# Patient Record
Sex: FEMALE | Race: WHITE | Employment: FULL TIME | URBAN - METROPOLITAN AREA
[De-identification: names, ages, dates, MRNs, and addresses within clinical notes are randomized per-mention and may not be internally consistent; named-entity substitution may affect disease eponyms.]

---

## 2019-09-28 ENCOUNTER — APPOINTMENT (OUTPATIENT)
Dept: GENERAL RADIOLOGY | Facility: CLINIC | Age: 34
End: 2019-09-28
Attending: EMERGENCY MEDICINE
Payer: COMMERCIAL

## 2019-09-28 ENCOUNTER — HOSPITAL ENCOUNTER (EMERGENCY)
Facility: CLINIC | Age: 34
Discharge: HOME | End: 2019-09-28
Attending: EMERGENCY MEDICINE | Admitting: EMERGENCY MEDICINE
Payer: COMMERCIAL

## 2019-09-28 VITALS
HEART RATE: 96 BPM | SYSTOLIC BLOOD PRESSURE: 115 MMHG | DIASTOLIC BLOOD PRESSURE: 84 MMHG | TEMPERATURE: 98.3 F | RESPIRATION RATE: 16 BRPM | OXYGEN SATURATION: 96 %

## 2019-09-28 DIAGNOSIS — R55 NEAR SYNCOPE: ICD-10-CM

## 2019-09-28 DIAGNOSIS — R07.9 CHEST PAIN, UNSPECIFIED TYPE: ICD-10-CM

## 2019-09-28 LAB
ANION GAP SERPL CALCULATED.3IONS-SCNC: 6 MMOL/L (ref 3–14)
BUN SERPL-MCNC: 23 MG/DL (ref 7–30)
CALCIUM SERPL-MCNC: 9.2 MG/DL (ref 8.5–10.1)
CHLORIDE SERPL-SCNC: 106 MMOL/L (ref 94–109)
CO2 SERPL-SCNC: 27 MMOL/L (ref 20–32)
CREAT SERPL-MCNC: 0.94 MG/DL (ref 0.52–1.04)
D DIMER PPP FEU-MCNC: 0.4 UG/ML FEU (ref 0–0.5)
ERYTHROCYTE [DISTWIDTH] IN BLOOD BY AUTOMATED COUNT: 11.9 % (ref 10–15)
GFR SERPL CREATININE-BSD FRML MDRD: 80 ML/MIN/{1.73_M2}
GLUCOSE SERPL-MCNC: 108 MG/DL (ref 70–99)
HCG SERPL QL: NEGATIVE
HCT VFR BLD AUTO: 44.3 % (ref 35–47)
HGB BLD-MCNC: 15.1 G/DL (ref 11.7–15.7)
MCH RBC QN AUTO: 31.5 PG (ref 26.5–33)
MCHC RBC AUTO-ENTMCNC: 34.1 G/DL (ref 31.5–36.5)
MCV RBC AUTO: 93 FL (ref 78–100)
PLATELET # BLD AUTO: 261 10E9/L (ref 150–450)
POTASSIUM SERPL-SCNC: 3.5 MMOL/L (ref 3.4–5.3)
RBC # BLD AUTO: 4.79 10E12/L (ref 3.8–5.2)
SODIUM SERPL-SCNC: 139 MMOL/L (ref 133–144)
TROPONIN I SERPL-MCNC: <0.015 UG/L (ref 0–0.04)
TSH SERPL DL<=0.005 MIU/L-ACNC: 1.77 MU/L (ref 0.4–4)
WBC # BLD AUTO: 9.6 10E9/L (ref 4–11)

## 2019-09-28 PROCEDURE — 96360 HYDRATION IV INFUSION INIT: CPT

## 2019-09-28 PROCEDURE — 85379 FIBRIN DEGRADATION QUANT: CPT | Performed by: EMERGENCY MEDICINE

## 2019-09-28 PROCEDURE — 99285 EMERGENCY DEPT VISIT HI MDM: CPT | Mod: 25

## 2019-09-28 PROCEDURE — 84443 ASSAY THYROID STIM HORMONE: CPT | Performed by: EMERGENCY MEDICINE

## 2019-09-28 PROCEDURE — 85027 COMPLETE CBC AUTOMATED: CPT | Performed by: EMERGENCY MEDICINE

## 2019-09-28 PROCEDURE — 96361 HYDRATE IV INFUSION ADD-ON: CPT

## 2019-09-28 PROCEDURE — 80048 BASIC METABOLIC PNL TOTAL CA: CPT | Performed by: EMERGENCY MEDICINE

## 2019-09-28 PROCEDURE — 84484 ASSAY OF TROPONIN QUANT: CPT | Performed by: EMERGENCY MEDICINE

## 2019-09-28 PROCEDURE — 93005 ELECTROCARDIOGRAM TRACING: CPT

## 2019-09-28 PROCEDURE — 25000128 H RX IP 250 OP 636: Performed by: EMERGENCY MEDICINE

## 2019-09-28 PROCEDURE — 84703 CHORIONIC GONADOTROPIN ASSAY: CPT | Performed by: EMERGENCY MEDICINE

## 2019-09-28 PROCEDURE — 71046 X-RAY EXAM CHEST 2 VIEWS: CPT

## 2019-09-28 RX ADMIN — SODIUM CHLORIDE 1000 ML: 9 INJECTION, SOLUTION INTRAVENOUS at 15:20

## 2019-09-28 ASSESSMENT — ENCOUNTER SYMPTOMS
DIZZINESS: 1
NAUSEA: 0
SHORTNESS OF BREATH: 1
NUMBNESS: 1

## 2019-09-28 NOTE — ED TRIAGE NOTES
Patient presents to the ED reporting lightheadedness and chest heaviness. States that yesterday had some numbness in her right hand and today has numbness in the left hand.

## 2019-09-28 NOTE — ED PROVIDER NOTES
History     Chief Complaint:  Dizziness      HPI   Kiki Barker is a 34 year old female who presents with dizziness which suddenly began at 1330 while she was at the store. She describes this sensation like she was going to fall down. She then she developed shortness of breath and went outside with her children but her symptoms didn't resolve. She then experienced numbness in the left arm. She used the Apple Watch EKG feature which was inconclusive. She also endorses chest heaviness. The heaviness is to the entire chest, constant and without aggravating factors. She did have numbness in right arm yesterday which she attributed to the flu shot. Her mother had A-Fib and had a stroke in her 30s. Patient denies nausea.     Cardiac Risk Factors   Sex: Female   Tobacco: Negative   Hypertension: Negative  Diabetes: Negative  Hyperlipidemia: Negative  Family History: Negative    PE/DVT Risk Factors   Personal History: Negative  Recent Travel: Travelled to [place] two weeks ago.  Recent Surgery/Hospitalization: Negative  Tobacco: Negative  Family History: Negative  Hormone Use: Positive- on oral birth control  Cancer: Negative  Trauma: Negative    Heterozygous for Factor V Leiden     Allergies:  Neomycin     Medications:    Cetirizine  Oral birth control     Past Medical History:    Depression  Heterozygous Factor V Leiden    Past Surgical History:    History reviewed. No pertinent past surgical history.    Family History:    AFib  Stroke    Social History:  Presents to the ED by herself.   PCP: Olivia Lieberman  Marital Status:  Single [1]     Review of Systems   Respiratory: Positive for shortness of breath.    Cardiovascular: Positive for chest pain.   Gastrointestinal: Negative for nausea.   Neurological: Positive for dizziness and numbness.   All other systems reviewed and are negative.    Physical Exam   First Vitals:  Patient Vitals for the past 24 hrs:   BP Temp Temp src Pulse Heart Rate Resp SpO2   09/28/19  1545 (!) 136/97 -- -- 94 98 -- 97 %   09/28/19 1530 135/89 -- -- 106 91 -- 98 %   09/28/19 1515 (!) 191/102 -- -- 94 -- -- 100 %   09/28/19 1510 (!) 191/102 98.3  F (36.8  C) Oral 128 -- 16 100 %       Physical Exam    General:   Pleasant, age appropriate female smiling and laughing during exam.  HEENT:    Oropharynx is moist  Eyes:    Conjunctiva normal  Neck:    Supple, no meningismus.     CV:     Tachycardic, regular rhythm.      No murmurs, rubs or gallops.       No unilateral leg swelling.       2+ radial pulses bilateral.       No lower extremity edema.  PULM:    Clear to auscultation bilateral.       No respiratory distress.      Good air exchange.     No rales or wheezing.  ABD:    Soft, non-tender, non-distended.       No pulsatile masses.       No rebound, guarding or rigidity.  MSK:     No gross deformity to all four extremities.   LYMPH:   No cervical lymphadenopathy.  NEURO:   Alert. Good muscle tone, no atrophy.     Speech is clear and without aphasia  Skin:    Warm, dry and intact.    Psych:    Mood is good and affect is appropriate.      HEART SCORE    History:   Highly suspicious          Moderately suspicious         Slightly suspicious     0    ECG:   Significant ST depression        Non-specific repolarization abnormality       Normal       0    Age:   > 65            45-65            < 45       0    Risk Factors:  3 or more           1-2            No risk factors      0    Troponin:   > 3x normal limit     1-3x normal limit     < normal limit      0    Total:           0      Emergency Department Course   ECG:  @ 1516  Indication: Dizziness  Vent. Rate 112 bpm. DE interval 122 ms. QRS duration 92 ms. QT/QTc 328/447 ms. P-R-T axis 51 13 90.   Sinus tachycardia. Possible lateral infarct, age undetermined. Cannot rule out inferior infarct, age undetermined. Abnormal ECG.    Read @ 1516 by Dr. Santamaria.    Imaging:  Radiographic findings were communicated with the patient who voiced understanding  of the findings.    Chest XR,  PA & LAT   Final Result   IMPRESSION: Negative chest.          Laboratory:  CBC:  WBC 9.6, HGB 15.1, , otherwise WNL  BMP: Glucose 108 (H), otherwise WNL (Creatinine 0.94)  Troponin: <0.015  D-Dimer: 0.4  TSH with free T4 reflex: 1.77  HCG: negative    Interventions:  1520: Normal Saline, 1 liter, IV bolus     Emergency Department Course:  The patient arrived in triage where vitals were measured and recorded.   The patient was then escorted back to the emergency department.   The patient's medical records were reviewed.  Nursing notes and vitals were reviewed.  1509: I performed an exam of the patient as documented above.  The above workup was undertaken.  1626: I rechecked the patient and discussed results.    Findings and plan explained to the Patient. Patient discharged home, status improved, with instructions regarding supportive care, medications, and reasons to return as well as the importance of close follow-up was reviewed.     Impression & Plan      Medical Decision Makin-year-old female presented to the ED with a sudden onset of dizziness described as near syncope associated with chest discomfort.  She was evaluated for atypical ACS.  EKG reveals no ischemic changes nor dysrhythmia.  Troponin is within normal limits.  Patient has a HEART score of 0. No indication for serial enzymes or provocative testing based on symptoms.  She does have risk factors for pulmonary embolus.  D-dimer negative thus no indication for CT scan of the chest.  No features to suggest aortic dissection ro aneurysm. She has no evidence of electrolyte disturbance, endocrinopathy, intravascular volume depletion to account for symptoms.  It is possible patient did have transient dysrhythmia but is no longer present.  I do not feel that Holter monitor placement is necessary at this time but if patient were to have recurrent symptoms, should be consideration.  Differential diagnosis would also  include vasovagal pathology.  Patient safe for discharge home with close follow-up primary care physician and will return to ED for any worsening symptoms.      Diagnosis:    ICD-10-CM    1. Near syncope R55    2. Chest pain, unspecified type R07.9        Disposition:  Discharged to home.       Ramona BRYSON, am serving as a scribe on 9/28/2019 at 3:09 PM to personally document services performed by Dr. Santamaria based on my observations and the provider's statements to me.   [facility] EMERGENCY DEPARTMENT       Everton Santamaria MD  09/29/19 1008

## 2019-09-28 NOTE — ED AVS SNAPSHOT
[facility] Emergency Department  [address]  [facility] [address]  Phone:  [phone]  Fax:  [phone]                                    Kiki Barker   MRN: [id number]    Department:  [facility] Emergency Department   Date of Visit:  9/28/2019           After Visit Summary Signature Page    I have received my discharge instructions, and my questions have been answered. I have discussed any challenges I see with this plan with the nurse or doctor.    ..........................................................................................................................................  Patient/Patient Representative Signature      ..........................................................................................................................................  Patient Representative Print Name and Relationship to Patient    ..................................................               ................................................  Date                                   Time    ..........................................................................................................................................  Reviewed by Signature/Title    ...................................................              ..............................................  Date                                               Time          22EPIC Rev 08/18

## 2019-09-30 LAB — INTERPRETATION ECG - MUSE: NORMAL

## 2020-09-24 ENCOUNTER — HOSPITAL ENCOUNTER (EMERGENCY)
Facility: CLINIC | Age: 35
Discharge: HOME | End: 2020-09-25
Attending: EMERGENCY MEDICINE
Payer: COMMERCIAL

## 2020-09-24 DIAGNOSIS — Z33.1 PREGNANT STATE, INCIDENTAL: ICD-10-CM

## 2020-09-24 DIAGNOSIS — R50.9 FEBRILE ILLNESS: ICD-10-CM

## 2020-09-24 PROBLEM — J45.20 MILD INTERMITTENT ASTHMA WITHOUT COMPLICATION: Status: ACTIVE | Noted: 2019-06-21

## 2020-09-24 LAB
ALBUMIN SERPL-MCNC: 4.2 G/DL (ref 3.4–5)
ALBUMIN UR-MCNC: NEGATIVE MG/DL
ALBUMIN UR-MCNC: NEGATIVE MG/DL
ALP SERPL-CCNC: 61 U/L (ref 40–150)
ALT SERPL W P-5'-P-CCNC: 14 U/L (ref 0–50)
ANION GAP SERPL CALCULATED.3IONS-SCNC: 7 MMOL/L (ref 3–14)
APPEARANCE UR: CLEAR
APPEARANCE UR: CLEAR
AST SERPL W P-5'-P-CCNC: 17 U/L (ref 0–45)
B-HCG SERPL-ACNC: 1631 IU/L (ref 0–5)
BACTERIA #/AREA URNS HPF: ABNORMAL /HPF
BASOPHILS # BLD AUTO: 0 10E9/L (ref 0–0.2)
BASOPHILS NFR BLD AUTO: 0.3 %
BILIRUB SERPL-MCNC: 1.9 MG/DL (ref 0.2–1.3)
BILIRUB UR QL STRIP: NEGATIVE
BILIRUB UR QL STRIP: NEGATIVE
BUN SERPL-MCNC: 16 MG/DL (ref 7–30)
CALCIUM SERPL-MCNC: 9.3 MG/DL (ref 8.5–10.1)
CHLORIDE SERPL-SCNC: 105 MMOL/L (ref 94–109)
CO2 SERPL-SCNC: 23 MMOL/L (ref 20–32)
COLOR UR AUTO: ABNORMAL
COLOR UR AUTO: NORMAL
CREAT SERPL-MCNC: 0.81 MG/DL (ref 0.52–1.04)
DIFFERENTIAL METHOD BLD: ABNORMAL
EOSINOPHIL # BLD AUTO: 0 10E9/L (ref 0–0.7)
EOSINOPHIL NFR BLD AUTO: 0.1 %
ERYTHROCYTE [DISTWIDTH] IN BLOOD BY AUTOMATED COUNT: 11.9 % (ref 10–15)
GFR SERPL CREATININE-BSD FRML MDRD: >90 ML/MIN/{1.73_M2}
GLUCOSE SERPL-MCNC: 108 MG/DL (ref 70–99)
GLUCOSE UR STRIP-MCNC: NEGATIVE MG/DL
GLUCOSE UR STRIP-MCNC: NEGATIVE MG/DL
HCT VFR BLD AUTO: 40.2 % (ref 35–47)
HGB BLD-MCNC: 13.5 G/DL (ref 11.7–15.7)
HGB UR QL STRIP: ABNORMAL
HGB UR QL STRIP: NEGATIVE
IMM GRANULOCYTES # BLD: 0.1 10E9/L (ref 0–0.4)
IMM GRANULOCYTES NFR BLD: 0.4 %
KETONES UR STRIP-MCNC: 20 MG/DL
KETONES UR STRIP-MCNC: NEGATIVE MG/DL
LACTATE BLD-SCNC: 1 MMOL/L (ref 0.7–2)
LEUKOCYTE ESTERASE UR QL STRIP: ABNORMAL
LEUKOCYTE ESTERASE UR QL STRIP: NEGATIVE
LYMPHOCYTES # BLD AUTO: 1.2 10E9/L (ref 0.8–5.3)
LYMPHOCYTES NFR BLD AUTO: 8.2 %
MCH RBC QN AUTO: 32.1 PG (ref 26.5–33)
MCHC RBC AUTO-ENTMCNC: 33.6 G/DL (ref 31.5–36.5)
MCV RBC AUTO: 96 FL (ref 78–100)
MONOCYTES # BLD AUTO: 0.7 10E9/L (ref 0–1.3)
MONOCYTES NFR BLD AUTO: 4.7 %
MUCOUS THREADS #/AREA URNS LPF: PRESENT /LPF
NEUTROPHILS # BLD AUTO: 13 10E9/L (ref 1.6–8.3)
NEUTROPHILS NFR BLD AUTO: 86.3 %
NITRATE UR QL: NEGATIVE
NITRATE UR QL: NEGATIVE
NRBC # BLD AUTO: 0 10*3/UL
NRBC BLD AUTO-RTO: 0 /100
PH UR STRIP: 6 PH (ref 5–7)
PH UR STRIP: 6.5 PH (ref 5–7)
PLATELET # BLD AUTO: 243 10E9/L (ref 150–450)
POTASSIUM SERPL-SCNC: 3.4 MMOL/L (ref 3.4–5.3)
PROT SERPL-MCNC: 7.7 G/DL (ref 6.8–8.8)
RBC # BLD AUTO: 4.21 10E12/L (ref 3.8–5.2)
RBC #/AREA URNS AUTO: 4 /HPF (ref 0–2)
RBC #/AREA URNS AUTO: <1 /HPF (ref 0–2)
SODIUM SERPL-SCNC: 135 MMOL/L (ref 133–144)
SOURCE: ABNORMAL
SOURCE: NORMAL
SP GR UR STRIP: 1.01 (ref 1–1.03)
SP GR UR STRIP: 1.01 (ref 1–1.03)
SQUAMOUS #/AREA URNS AUTO: 5 /HPF (ref 0–1)
UROBILINOGEN UR STRIP-MCNC: NORMAL MG/DL (ref 0–2)
UROBILINOGEN UR STRIP-MCNC: NORMAL MG/DL (ref 0–2)
WBC # BLD AUTO: 15.1 10E9/L (ref 4–11)
WBC #/AREA URNS AUTO: 22 /HPF (ref 0–5)
WBC #/AREA URNS AUTO: <1 /HPF (ref 0–5)

## 2020-09-24 PROCEDURE — 87181 SC STD AGAR DILUTION PER AGT: CPT | Performed by: EMERGENCY MEDICINE

## 2020-09-24 PROCEDURE — U0003 INFECTIOUS AGENT DETECTION BY NUCLEIC ACID (DNA OR RNA); SEVERE ACUTE RESPIRATORY SYNDROME CORONAVIRUS 2 (SARS-COV-2) (CORONAVIRUS DISEASE [COVID-19]), AMPLIFIED PROBE TECHNIQUE, MAKING USE OF HIGH THROUGHPUT TECHNOLOGIES AS DESCRIBED BY CMS-2020-01-R: HCPCS | Performed by: EMERGENCY MEDICINE

## 2020-09-24 PROCEDURE — 96365 THER/PROPH/DIAG IV INF INIT: CPT

## 2020-09-24 PROCEDURE — 84702 CHORIONIC GONADOTROPIN TEST: CPT | Performed by: NURSE PRACTITIONER

## 2020-09-24 PROCEDURE — 80053 COMPREHEN METABOLIC PANEL: CPT | Performed by: NURSE PRACTITIONER

## 2020-09-24 PROCEDURE — 36415 COLL VENOUS BLD VENIPUNCTURE: CPT | Performed by: EMERGENCY MEDICINE

## 2020-09-24 PROCEDURE — 83605 ASSAY OF LACTIC ACID: CPT | Performed by: NURSE PRACTITIONER

## 2020-09-24 PROCEDURE — 87040 BLOOD CULTURE FOR BACTERIA: CPT | Performed by: EMERGENCY MEDICINE

## 2020-09-24 PROCEDURE — 81001 URINALYSIS AUTO W/SCOPE: CPT | Performed by: NURSE PRACTITIONER

## 2020-09-24 PROCEDURE — C9803 HOPD COVID-19 SPEC COLLECT: HCPCS

## 2020-09-24 PROCEDURE — 81001 URINALYSIS AUTO W/SCOPE: CPT | Performed by: EMERGENCY MEDICINE

## 2020-09-24 PROCEDURE — 25800030 ZZH RX IP 258 OP 636: Performed by: NURSE PRACTITIONER

## 2020-09-24 PROCEDURE — 36415 COLL VENOUS BLD VENIPUNCTURE: CPT

## 2020-09-24 PROCEDURE — 85025 COMPLETE CBC W/AUTO DIFF WBC: CPT | Performed by: NURSE PRACTITIONER

## 2020-09-24 PROCEDURE — 25000128 H RX IP 250 OP 636: Performed by: EMERGENCY MEDICINE

## 2020-09-24 PROCEDURE — 96361 HYDRATE IV INFUSION ADD-ON: CPT

## 2020-09-24 PROCEDURE — 96366 THER/PROPH/DIAG IV INF ADDON: CPT

## 2020-09-24 PROCEDURE — 87800 DETECT AGNT MULT DNA DIREC: CPT | Performed by: EMERGENCY MEDICINE

## 2020-09-24 PROCEDURE — 87077 CULTURE AEROBIC IDENTIFY: CPT | Performed by: EMERGENCY MEDICINE

## 2020-09-24 PROCEDURE — 87086 URINE CULTURE/COLONY COUNT: CPT | Performed by: EMERGENCY MEDICINE

## 2020-09-24 PROCEDURE — 99285 EMERGENCY DEPT VISIT HI MDM: CPT | Mod: 25

## 2020-09-24 RX ORDER — CEFTRIAXONE 2 G/1
2 INJECTION, POWDER, FOR SOLUTION INTRAMUSCULAR; INTRAVENOUS ONCE
Status: COMPLETED | OUTPATIENT
Start: 2020-09-24 | End: 2020-09-25

## 2020-09-24 RX ADMIN — CEFTRIAXONE 2 G: 2 INJECTION, POWDER, FOR SOLUTION INTRAMUSCULAR; INTRAVENOUS at 22:57

## 2020-09-24 RX ADMIN — SODIUM CHLORIDE 1000 ML: 9 INJECTION, SOLUTION INTRAVENOUS at 21:25

## 2020-09-24 ASSESSMENT — ENCOUNTER SYMPTOMS
CHILLS: 1
BACK PAIN: 1
HEADACHES: 0
COUGH: 0
VOMITING: 0
DIZZINESS: 0
DIARRHEA: 0
NAUSEA: 1
SORE THROAT: 0
FATIGUE: 1
FEVER: 1
ABDOMINAL PAIN: 0
SHORTNESS OF BREATH: 0

## 2020-09-24 NOTE — ED AVS SNAPSHOT
[facility] Emergency Department  [address]  [facility] [address]  Phone:  [phone]  Fax:  [phone]                                    Kiki Barker   MRN: [id number]    Department:  [facility] Emergency Department   Date of Visit:  9/24/2020           After Visit Summary Signature Page    I have received my discharge instructions, and my questions have been answered. I have discussed any challenges I see with this plan with the nurse or doctor.    ..........................................................................................................................................  Patient/Patient Representative Signature      ..........................................................................................................................................  Patient Representative Print Name and Relationship to Patient    ..................................................               ................................................  Date                                   Time    ..........................................................................................................................................  Reviewed by Signature/Title    ...................................................              ..............................................  Date                                               Time          22EPIC Rev 08/18

## 2020-09-24 NOTE — ED TRIAGE NOTES
Pt has bleeding apporx 5 1/2 wks pregnant ongoing past 8 hours, hx of post partum sepsis concern for fever today OB couldn't see her for fever 100.9 at home PTA. Pt is tachycardic on arrival. No tylenol PTA. Pt reports mild low back pain. Pt reports bleeding she filled one pad at first now bleeding slowed. A/O X 4, abc's intact. Pt reports no clots.

## 2020-09-25 ENCOUNTER — HOSPITAL ENCOUNTER (INPATIENT)
Facility: CLINIC | Age: 35
LOS: 3 days | Discharge: HOME | End: 2020-09-28
Attending: PHYSICIAN ASSISTANT | Admitting: INTERNAL MEDICINE
Payer: COMMERCIAL

## 2020-09-25 ENCOUNTER — APPOINTMENT (OUTPATIENT)
Dept: ULTRASOUND IMAGING | Facility: CLINIC | Age: 35
End: 2020-09-25
Attending: EMERGENCY MEDICINE
Payer: COMMERCIAL

## 2020-09-25 VITALS
TEMPERATURE: 98.5 F | HEART RATE: 138 BPM | OXYGEN SATURATION: 100 % | DIASTOLIC BLOOD PRESSURE: 81 MMHG | RESPIRATION RATE: 18 BRPM | SYSTOLIC BLOOD PRESSURE: 125 MMHG

## 2020-09-25 DIAGNOSIS — R78.81 BACTEREMIA: ICD-10-CM

## 2020-09-25 DIAGNOSIS — O03.87 SPONTANEOUS ABORTION WITH SEPTICEMIA: Primary | ICD-10-CM

## 2020-09-25 LAB
ALBUMIN SERPL-MCNC: 3.9 G/DL (ref 3.4–5)
ALP SERPL-CCNC: 60 U/L (ref 40–150)
ALT SERPL W P-5'-P-CCNC: 17 U/L (ref 0–50)
ANION GAP SERPL CALCULATED.3IONS-SCNC: 5 MMOL/L (ref 3–14)
AST SERPL W P-5'-P-CCNC: 17 U/L (ref 0–45)
BASOPHILS # BLD AUTO: 0 10E9/L (ref 0–0.2)
BASOPHILS NFR BLD AUTO: 0.5 %
BILIRUB SERPL-MCNC: 1.2 MG/DL (ref 0.2–1.3)
BUN SERPL-MCNC: 12 MG/DL (ref 7–30)
CALCIUM SERPL-MCNC: 8.8 MG/DL (ref 8.5–10.1)
CHLORIDE SERPL-SCNC: 108 MMOL/L (ref 94–109)
CO2 SERPL-SCNC: 24 MMOL/L (ref 20–32)
CREAT SERPL-MCNC: 0.78 MG/DL (ref 0.52–1.04)
DIFFERENTIAL METHOD BLD: NORMAL
EOSINOPHIL # BLD AUTO: 0 10E9/L (ref 0–0.7)
EOSINOPHIL NFR BLD AUTO: 0.3 %
ERYTHROCYTE [DISTWIDTH] IN BLOOD BY AUTOMATED COUNT: 11.9 % (ref 10–15)
GFR SERPL CREATININE-BSD FRML MDRD: >90 ML/MIN/{1.73_M2}
GLUCOSE SERPL-MCNC: 90 MG/DL (ref 70–99)
HCT VFR BLD AUTO: 41.2 % (ref 35–47)
HGB BLD-MCNC: 13.8 G/DL (ref 11.7–15.7)
IMM GRANULOCYTES # BLD: 0 10E9/L (ref 0–0.4)
IMM GRANULOCYTES NFR BLD: 0.2 %
INTERPRETATION ECG - MUSE: NORMAL
LACTATE BLD-SCNC: 0.9 MMOL/L (ref 0.7–2)
LIPASE SERPL-CCNC: 167 U/L (ref 73–393)
LYMPHOCYTES # BLD AUTO: 1 10E9/L (ref 0.8–5.3)
LYMPHOCYTES NFR BLD AUTO: 16.5 %
MCH RBC QN AUTO: 31.9 PG (ref 26.5–33)
MCHC RBC AUTO-ENTMCNC: 33.5 G/DL (ref 31.5–36.5)
MCV RBC AUTO: 95 FL (ref 78–100)
MONOCYTES # BLD AUTO: 0.5 10E9/L (ref 0–1.3)
MONOCYTES NFR BLD AUTO: 7.3 %
NEUTROPHILS # BLD AUTO: 4.7 10E9/L (ref 1.6–8.3)
NEUTROPHILS NFR BLD AUTO: 75.2 %
NRBC # BLD AUTO: 0 10*3/UL
NRBC BLD AUTO-RTO: 0 /100
PLATELET # BLD AUTO: 218 10E9/L (ref 150–450)
POTASSIUM SERPL-SCNC: 3.5 MMOL/L (ref 3.4–5.3)
PROT SERPL-MCNC: 7.9 G/DL (ref 6.8–8.8)
RBC # BLD AUTO: 4.32 10E12/L (ref 3.8–5.2)
SARS-COV-2 RNA SPEC QL NAA+PROBE: NOT DETECTED
SODIUM SERPL-SCNC: 137 MMOL/L (ref 133–144)
SPECIMEN SOURCE: NORMAL
WBC # BLD AUTO: 6.2 10E9/L (ref 4–11)

## 2020-09-25 PROCEDURE — 76801 OB US < 14 WKS SINGLE FETUS: CPT

## 2020-09-25 PROCEDURE — 96365 THER/PROPH/DIAG IV INF INIT: CPT

## 2020-09-25 PROCEDURE — 12000000 ZZH R&B MED SURG/OB

## 2020-09-25 PROCEDURE — 76770 US EXAM ABDO BACK WALL COMP: CPT

## 2020-09-25 PROCEDURE — 83690 ASSAY OF LIPASE: CPT | Performed by: PHYSICIAN ASSISTANT

## 2020-09-25 PROCEDURE — C9803 HOPD COVID-19 SPEC COLLECT: HCPCS

## 2020-09-25 PROCEDURE — 99223 1ST HOSP IP/OBS HIGH 75: CPT | Performed by: INTERNAL MEDICINE

## 2020-09-25 PROCEDURE — 84702 CHORIONIC GONADOTROPIN TEST: CPT | Performed by: PHYSICIAN ASSISTANT

## 2020-09-25 PROCEDURE — 83605 ASSAY OF LACTIC ACID: CPT | Performed by: PHYSICIAN ASSISTANT

## 2020-09-25 PROCEDURE — 80053 COMPREHEN METABOLIC PANEL: CPT | Performed by: PHYSICIAN ASSISTANT

## 2020-09-25 PROCEDURE — 85025 COMPLETE CBC W/AUTO DIFF WBC: CPT | Performed by: PHYSICIAN ASSISTANT

## 2020-09-25 PROCEDURE — 25000132 ZZH RX MED GY IP 250 OP 250 PS 637: Performed by: PHYSICIAN ASSISTANT

## 2020-09-25 PROCEDURE — 96361 HYDRATE IV INFUSION ADD-ON: CPT

## 2020-09-25 PROCEDURE — 25000128 H RX IP 250 OP 636: Performed by: PHYSICIAN ASSISTANT

## 2020-09-25 PROCEDURE — 99285 EMERGENCY DEPT VISIT HI MDM: CPT | Mod: 25

## 2020-09-25 PROCEDURE — 87040 BLOOD CULTURE FOR BACTERIA: CPT | Performed by: PHYSICIAN ASSISTANT

## 2020-09-25 PROCEDURE — 25800030 ZZH RX IP 258 OP 636: Performed by: PHYSICIAN ASSISTANT

## 2020-09-25 PROCEDURE — U0003 INFECTIOUS AGENT DETECTION BY NUCLEIC ACID (DNA OR RNA); SEVERE ACUTE RESPIRATORY SYNDROME CORONAVIRUS 2 (SARS-COV-2) (CORONAVIRUS DISEASE [COVID-19]), AMPLIFIED PROBE TECHNIQUE, MAKING USE OF HIGH THROUGHPUT TECHNOLOGIES AS DESCRIBED BY CMS-2020-01-R: HCPCS | Performed by: PHYSICIAN ASSISTANT

## 2020-09-25 RX ORDER — SODIUM CHLORIDE 9 MG/ML
INJECTION, SOLUTION INTRAVENOUS CONTINUOUS
Status: DISCONTINUED | OUTPATIENT
Start: 2020-09-25 | End: 2020-09-26

## 2020-09-25 RX ORDER — ACETAMINOPHEN 500 MG
1000 TABLET ORAL ONCE
Status: COMPLETED | OUTPATIENT
Start: 2020-09-25 | End: 2020-09-25

## 2020-09-25 RX ORDER — CEFTRIAXONE 2 G/1
2 INJECTION, POWDER, FOR SOLUTION INTRAMUSCULAR; INTRAVENOUS ONCE
Status: COMPLETED | OUTPATIENT
Start: 2020-09-25 | End: 2020-09-25

## 2020-09-25 RX ADMIN — SODIUM CHLORIDE 1000 ML: 9 INJECTION, SOLUTION INTRAVENOUS at 22:26

## 2020-09-25 RX ADMIN — CEFTRIAXONE 2 G: 2 INJECTION, POWDER, FOR SOLUTION INTRAMUSCULAR; INTRAVENOUS at 23:16

## 2020-09-25 RX ADMIN — SODIUM CHLORIDE 1000 ML: 9 INJECTION, SOLUTION INTRAVENOUS at 22:27

## 2020-09-25 RX ADMIN — ACETAMINOPHEN 1000 MG: 500 TABLET, FILM COATED ORAL at 22:27

## 2020-09-25 RX ADMIN — SODIUM CHLORIDE: 9 INJECTION, SOLUTION INTRAVENOUS at 23:59

## 2020-09-25 ASSESSMENT — ENCOUNTER SYMPTOMS
MYALGIAS: 1
CHEST TIGHTNESS: 0
HEMATURIA: 0
FLANK PAIN: 0
SHORTNESS OF BREATH: 0
VOMITING: 0
DYSURIA: 0
FATIGUE: 1
DIARRHEA: 0
FEVER: 1
COUGH: 0
ABDOMINAL PAIN: 1
FREQUENCY: 0
CHILLS: 1
NAUSEA: 1

## 2020-09-25 NOTE — ED NOTES
[facility]  ED Nurse Handoff Report    Kiki Barker is a 35 year old female   ED Chief complaint: Fever  . ED Diagnosis:   Final diagnoses:   Pyelonephritis, acute     Allergies:   Allergies   Allergen Reactions     Neomycin        Code Status: Full Code  Activity level - Baseline/Home:  Independent. Activity Level - Current:   Stand by Assist. Lift room needed: No. Bariatric: No   Needed: No   Isolation: No. Infection: Not Applicable.     Vital Signs:   Vitals:    09/24/20 1844 09/24/20 2250 09/24/20 2300   BP: (!) 145/99     Pulse: 138     Resp: 20     Temp: 99.5  F (37.5  C)     TempSrc: Oral     SpO2: 97% 100% 98%       Cardiac Rhythm:  ,      Pain level:    Patient confused: No. Patient Falls Risk: Yes.   Elimination Status: Has voided   Patient Report - Initial Complaint: heavy spotting. Focused Assessment: heavy spotting, febrile   Tests Performed:   Labs Ordered and Resulted from Time of ED Arrival Up to the Time of Departure from the ED   CBC WITH PLATELETS DIFFERENTIAL - Abnormal; Notable for the following components:       Result Value    WBC 15.1 (*)     Absolute Neutrophil 13.0 (*)     All other components within normal limits   COMPREHENSIVE METABOLIC PANEL - Abnormal; Notable for the following components:    Glucose 108 (*)     Bilirubin Total 1.9 (*)     All other components within normal limits   ROUTINE UA WITH MICROSCOPIC REFLEX TO CULTURE - Abnormal; Notable for the following components:    Ketones Urine 20 (*)     Blood Urine Trace (*)     Leukocyte Esterase Urine Moderate (*)     WBC Urine 22 (*)     RBC Urine 4 (*)     Bacteria Urine Few (*)     Squamous Epithelial /HPF Urine 5 (*)     Mucous Urine Present (*)     All other components within normal limits   HCG QUANTITATIVE PREGNANCY - Abnormal; Notable for the following components:    HCG Quantitative Serum 1,631 (*)     All other components within normal limits   LACTIC ACID WHOLE BLOOD   COVID-19 VIRUS  (CORONAVIRUS) BY PCR   ROUTINE UA WITH MICROSCOPIC   MAY SALINE LOCK IV   BLOOD CULTURE   BLOOD CULTURE   URINE CULTURE AEROBIC BACTERIAL     US OB 1st Trimester W Transvaginal W Doppler    (Results Pending)   US Renal Complete    (Results Pending)     . Abnormal Results: US still pending.   Treatments provided: IVF, Abx  Family Comments: N/A  OBS brochure/video discussed/provided to patient:  N/A  ED Medications:   Medications   cefTRIAXone (ROCEPHIN) 2 g vial to attach to  ml bag for ADULTS or NS 50 ml bag for PEDS (2 g Intravenous New Bag 9/24/20 2257)   0.9% sodium chloride BOLUS (0 mLs Intravenous Stopped 9/24/20 2257)     Drips infusing:  No  For the majority of the shift, the patient's behavior Green. Interventions performed were N/A.    Sepsis treatment initiated: No     Patient tested for COVID 19 prior to admission: YES    ED Nurse Name/Phone Number: Petr Dow RN,   11:19 PM

## 2020-09-25 NOTE — ED PROVIDER NOTES
Repeat heart rate 107 at 1:15 AM.  Patient continues to feel well.  She was informed that her ultrasound reveals a gestational sac without yolk sac.  This does not rule out ectopic pregnancy although no additional features to suggest ectopic pregnancy on ultrasound.  The renal ultrasound is negative for hydronephrosis thus very low suspicion for ureteral stone.  Patient will be discharged home per the recommendations of Dr. Azevedo with close follow-up.     Everton Santamaria MD  09/25/20 0116

## 2020-09-25 NOTE — DISCHARGE INSTRUCTIONS
Discharge Instructions  Fever    You have been seen today for a fever. Fever is a normal body reaction to illness or inflammation. Fever is a sign that your body is doing what it should to fight something off. Fever is not dangerous, but it can make you feel miserable, and you will probably feel better if you get your fever to go down. Most infections are caused by a virus, and antibiotics will not help; your provider will tell you whether antibiotics are needed in your case. At this time your provider does not find that your fever is a sign of anything dangerous or life-threatening.  However, sometimes the signs of serious illness do not show up right away so additional care may be necessary.    Generally, every Emergency Department visit should have a follow-up clinic visit with either a primary or a specialty clinic/provider. Please follow-up as instructed by your emergency provider today.    What can I do to help myself?  Fill any prescriptions the provider gave you and take them right away--especially antibiotics.  If you have a fever, get plenty of rest and drink lots of fluids, especially water.  What clothes or blankets you have on will not change your fever. Do what is comfortable for you.  Bathing or sponging in lukewarm water may help you feel better.  Tylenol  (acetaminophen), Motrin  (ibuprofen), or Advil  (ibuprofen) help bring fever down and may help you feel more comfortable. Be sure to read and follow the package directions, and ask your provider if you have questions.  Do not drink alcohol.    Return to the Emergency Department if:  Any of the symptoms you have get much worse.  You seem very sick, like being too weak to get up.  You have any new symptoms, especially serious things like abdominal (belly) pain or chest pain.  You are short of breath.  You have a severe headache.  You are vomiting (throwing up) so much you cannot keep fluids or medicines down.  You have confusion or seem unusually  drowsy.  You have a seizure.  You have anything else that worries you.  If you were given a prescription for medicine here today, be sure to read all of the information (including the package insert) that comes with your prescription.  This will include important information about the medicine, its side effects, and any warnings that you need to know about.  The pharmacist who fills the prescription can provide more information and answer questions you may have about the medicine.  If you have questions or concerns that the pharmacist cannot address, please call or return to the Emergency Department.   Remember that you can always come back to the Emergency Department if you are not able to see your regular provider in the amount of time listed above, if you get any new symptoms, or if there is anything that worries you.  Discharge Instructions  COVID-19    COVID-19 is the disease caused by a new coronavirus. The virus spreads from person to person primarily by droplets when an infected person coughs or sneezes and the droplet either lands on another person or that other person touches a surface with the droplet on it. Diagnosis of COVID-19 can be made with a test but many times the test is not immediately available or not necessary. There is no specific treatment or medicine for the disease.    You may have been diagnosed with COVID, may be being tested for COVID and have a pending test result, or may have been exposed to COVID.    Symptoms of COVID-19  Many people have no symptoms or mild symptoms.  Symptoms may usually appear 4 to 5 days (up to 14 days) after contact with another ill person. Some people will get severe symptoms and pneumonia. Usual symptoms are:     ? Fever  ? Cough  ? Trouble breathing  ? Less common symptoms are: Headache, body aches, sore throat,      sneezing, diarrhea, loss of taste or smell.    Stay home  Most people will recover from COVID illness with mild symptoms.  Isolation by staying  home is the best method to prevent the spread of the illness. Do not go to work or school. Have a friend or relative do your shopping. Do not use public transportation (bus, train) or ridesharing (Lyft, Uber).    If you have symptoms of COVID, you should stay home for at least 10 days, and for 24 hours with no fever and improvement of symptoms--whichever is longer. (Your fever should be gone for 24 hours without using fever-reducing medicine)    For example, if you have a fever and cough for 6 days, you need to stay home 4 more days with no fever for a total of 10 days. Or, if you have a fever and cough for 10 days, you need to stay home one more day with no fever for a total of 11 days.    If you have an exposure to COVID (you spent 15 minutes or more within six feet of somebody who has COVID), you should stay home for 14 days.    If you are being tested for COVID and your test is pending, you should stay home until you know your test result.    How should I protect myself and others?    Separate yourself from other people in your home.?As much as possible, you should stay in one room and away from other people in your home. Also, use a separate bathroom, if possible. Avoid handling pets or other animals while sick.     Wear a facemask if you need to be around other people and cover your mouth and nose with a tissue when you cough or sneeze.     Avoid sharing personal household items. You should not share dishes, drinking glasses, forks/knives/spoons, towels, or bedding with other people in your home. After using these items, they should be washed with soap and water. Clean parts of your home that are touched often (doorknobs, faucets, countertops, etc.) daily.     Wash your hands often with soap and water for at least 20 seconds or use an alcohol-based hand  containing at least 60% alcohol.     Avoid touching your face.    Treat your symptoms. You can take Acetaminophen (Tylenol) to treat body aches and  fever as needed for comfort. Ibuprofen (Advil or Motrin) can be used as well if you still have symptoms after taking Tylenol. Drink fluids. Rest.    Watch for worsening symptoms such as shortness of breath/difficulty breathing or very severe weakness.    Employers/workplaces are being asked by the Centers for Disease Control (CDC) to not request notes/documentation for you to return to work or prove that you were ill. You may choose to show your employer this paperwork. Also, repeat testing should not be required to return to work.    Return to the Emergency Department if:    If you are developing worsening breathing, shortness of breath, or feel worse you should seek medical attention.  If you are uncertain, contact your health care provider/clinic. If you need emergency medical attention, call 911 and tell them you have been ill.

## 2020-09-25 NOTE — ED PROVIDER NOTES
"  History     Chief Complaint:  Fever       The history is provided by the patient.      Kiki Barker is a 35 year old female who presents with fever, back pain and vaginal spotting.  Patient reports she is approximately 6 weeks pregnant by last menstrual period.  Today she reported she began to feel \"crummy\" with lower back pain and myalgias.  She took her temperature after shower and noted that she was febrile to 101.  Came in to the emergency department for further evaluation the request of her OB/GYN.  Here she denies any headache, vision changes, sore throat, chest pain, cough or shortness of breath.  She does have nausea without vomiting.  She denies any abdominal pain.  She notes pain in her bilateral low back.  She denies any dysuria, hematuria or urgency.  She has not had any significant vaginal discharge but does note some very mild spotting.  She equates this much less than a period.  She denies any rash.  No recent sick contacts to her knowledge.    Allergies:  Neomycin    Medications:    The patient is not currently taking any prescribed medications.    Past Medical History:    History reviewed. No pertinent past medical history.    Past Surgical History:    History reviewed.  No pertinent past surgical history.    Family History:    History reviewed. No pertinent family history.    Social History:  The patient presents to the ED alone.  PCP: Olivia Lieberman     Review of Systems   Constitutional: Positive for chills, fatigue and fever.   HENT: Negative for sore throat.    Respiratory: Negative for cough and shortness of breath.    Cardiovascular: Negative for chest pain.   Gastrointestinal: Positive for nausea. Negative for abdominal pain, diarrhea and vomiting.   Musculoskeletal: Positive for back pain.   Neurological: Negative for dizziness and headaches.   All other systems reviewed and are negative.    Physical Exam     Patient Vitals for the past 24 hrs:   BP Temp Temp src Pulse Resp SpO2 "   09/24/20 2300 -- -- -- -- -- 98 %   09/24/20 2250 -- -- -- -- -- 100 %   09/24/20 1844 (!) 145/99 99.5  F (37.5  C) Oral 138 20 97 %       Physical Exam  General: Patient is awake, alert and interactive when I enter the room  Head: The scalp, face, and head appear normal  Eyes: The pupils are equal, round, and reactive to light. Conjunctivae and sclerae are normal  Neck: Normal range of motion. No anterior cervical lymphadenopathy noted  CV: tachycardiac. S1/S2. No murmurs.   Resp: Lungs are clear without wheezes or rales. No respiratory distress.   GI: Abdomen is soft, no rigidity, guarding, or rebound. No distension. No tenderness to palpation in any quadrant. No CVA tenderness.     MS: Normal tone. Joints grossly normal without effusions. No asymmetric leg swelling, calf or thigh tenderness.    Skin: No rash or lesions noted. Normal capillary refill noted  Neuro: Speech is normal and fluent. Face is symmetric. Moving all extremities.   Psych:  Normal affect.  Appropriate interactions.    Emergency Department Course     Imaging:  Radiology findings were communicated with the patient and admitting MD who voiced understanding of the findings.      US OB 1st Trimester W Transvaginal W Doppler    (Results Pending)   US Renal Complete    (Results Pending)       Laboratory:  Laboratory findings were communicated with the patient and admitting MD who voiced understanding of the findings.    CBC: WBC: 15.1 (high), HGB: 13.5, PLT: 243    CMP: Glucose 108 (high), Bilirubin 1.9 (high), o/w WNL (Creatinine: 0.81)    2126 Lactic Acid Whole Blood: 1.0    HCG Quantitative Pregnancy: 1,631 (high)     UA with Microscopic: Ketones 20, Blood Trace, Leukocyte Esterace Moderate, WBC 22, RBC 4, Bacteria Few, Squamous Epithelial 5, Mucous Present o/w Negative    UA with microscopic: Negative ketones, negative leukoesterase, negative nitrite, negative WBCs, negative RBCs    Urine Culture Aerobic Bacterial: Pending    Blood Culture x2:  Pending    Symptomatic COVID-19 Virus (Coronavirus) PCR: Pending     Interventions:  2125 NS 1L IV     Emergency Department Course:  Past medical records, nursing notes, and vitals reviewed.    2053 I performed an exam of the patient as documented above.     IV was inserted and blood was drawn for laboratory testing, results above.    The patient provided a urine sample here in the emergency department. This was sent for laboratory testing, findings above.    The patient was sent for an ultrasound while in the emergency department, results above.     2252 I rechecked the patient and discussed the results of her workup thus far. At this point I feel that the patient is safe for discharge, and the patient agrees.     2307 I consulted with Dr. Edmonds, hospitalist, regarding the patient's history and presentation here in the emergency department.     Findings and plan explained to the patient who consents to admission. Discussed the patient with Dr. Edmonds, who will admit the patient to a medical bed for further monitoring, evaluation, and treatment.    I personally reviewed the laboratory and imaging results with the patient and answered all related questions prior to admission.     Impression & Plan     Covid-19  Kiki Barker was evaluated during a global COVID-19 pandemic, which necessitated consideration that the patient might be at risk for infection with the SARS-CoV-2 virus that causes COVID-19.   Applicable protocols for evaluation were followed during the patient's care. COVID-19 was considered as part of the patient's evaluation. The plan for testing is: a test was obtained during this visit.    Medical Decision Making:  Patient is a 35-year-old female who is approximately 6-1/2 weeks pregnant who presents emergency department with low-grade fever, tachycardia, vaginal spotting and mild low back pain.  On initial evaluation she is tachycardic but otherwise hemodynamically stable.  She is afebrile.  She is  oxygenating well on room air.  Clinically she actually looks quite well.  On her abdominal exam she has no significant tenderness, guarding or rebound.  Patient does not have any significant midline back tenderness or CVA tenderness on my exam.  Initial urine was obtained via clean catch which was suggestive of infection.  Patient was started on antibiotics however we repeated a second urine with a catheter specimen and this does not reveal any signs of infection.  Pyelonephritis is unlikely in this patient given her second urinary specimen.  Given her vaginal spotting and complaint of back pain I did obtain a pelvic ultrasound to rule out ectopic pregnancy.  This is currently pending at the time of note writing.  Renal ultrasound was also obtained to rule out possibility of nephrolithiasis and hydronephrosis.  COVID was considered as well and swab was obtained and patient was placed on precautions.  Patient did remain tachycardic despite fluid resuscitation however in review of her chart she does have baseline sinus tachycardia and has been evaluated by electrophysiology in the past.  Clinically she continues to look quite well and I believe if her ultrasounds are reassuring that she can be discharged home.    Diagnosis:    ICD-10-CM    1. Pyelonephritis, acute  N10        Disposition:  Admitted to Dr. Edmonds.    Scribe Disclosure:  I, Ji Steiner, am serving as a scribe at 8:53 PM on 9/24/2020 to document services personally performed by Jayme De La Vega MD based on my observations and the provider's statements to me.      Jayme De La Vega MD  09/24/20 2198

## 2020-09-26 ENCOUNTER — ANESTHESIA (OUTPATIENT)
Dept: SURGERY | Facility: CLINIC | Age: 35
End: 2020-09-26
Payer: COMMERCIAL

## 2020-09-26 ENCOUNTER — ANESTHESIA EVENT (OUTPATIENT)
Dept: SURGERY | Facility: CLINIC | Age: 35
End: 2020-09-26
Payer: COMMERCIAL

## 2020-09-26 ENCOUNTER — APPOINTMENT (OUTPATIENT)
Dept: ULTRASOUND IMAGING | Facility: CLINIC | Age: 35
End: 2020-09-26
Attending: PHYSICIAN ASSISTANT
Payer: COMMERCIAL

## 2020-09-26 ENCOUNTER — APPOINTMENT (OUTPATIENT)
Dept: ULTRASOUND IMAGING | Facility: CLINIC | Age: 35
End: 2020-09-26
Attending: INTERNAL MEDICINE
Payer: COMMERCIAL

## 2020-09-26 PROBLEM — R78.81 BACTEREMIA: Status: ACTIVE | Noted: 2020-09-26

## 2020-09-26 PROBLEM — O03.87 SPONTANEOUS ABORTION WITH SEPTICEMIA: Status: ACTIVE | Noted: 2020-09-26

## 2020-09-26 LAB
ABO + RH BLD: NORMAL
ABO + RH BLD: NORMAL
ALBUMIN UR-MCNC: 10 MG/DL
ANION GAP SERPL CALCULATED.3IONS-SCNC: 4 MMOL/L (ref 3–14)
APPEARANCE UR: CLEAR
B-HCG SERPL-ACNC: 2591 IU/L (ref 0–5)
B-HCG SERPL-ACNC: 2657 IU/L (ref 0–5)
BACTERIA SPEC CULT: NORMAL
BILIRUB UR QL STRIP: NEGATIVE
BLD GP AB SCN SERPL QL: NORMAL
BLOOD BANK CMNT PATIENT-IMP: NORMAL
BUN SERPL-MCNC: 9 MG/DL (ref 7–30)
CALCIUM SERPL-MCNC: 7.5 MG/DL (ref 8.5–10.1)
CHLORIDE SERPL-SCNC: 112 MMOL/L (ref 94–109)
CO2 SERPL-SCNC: 22 MMOL/L (ref 20–32)
COLOR UR AUTO: ABNORMAL
CREAT SERPL-MCNC: 0.76 MG/DL (ref 0.52–1.04)
CRP SERPL-MCNC: 42.8 MG/L (ref 0–8)
ERYTHROCYTE [DISTWIDTH] IN BLOOD BY AUTOMATED COUNT: 11.9 % (ref 10–15)
GFR SERPL CREATININE-BSD FRML MDRD: >90 ML/MIN/{1.73_M2}
GLUCOSE SERPL-MCNC: 89 MG/DL (ref 70–99)
GLUCOSE UR STRIP-MCNC: NEGATIVE MG/DL
HCT VFR BLD AUTO: 35 % (ref 35–47)
HGB BLD-MCNC: 11.4 G/DL (ref 11.7–15.7)
HGB UR QL STRIP: NEGATIVE
KETONES UR STRIP-MCNC: 150 MG/DL
LABORATORY COMMENT REPORT: NORMAL
LEUKOCYTE ESTERASE UR QL STRIP: NEGATIVE
Lab: NORMAL
MCH RBC QN AUTO: 31.5 PG (ref 26.5–33)
MCHC RBC AUTO-ENTMCNC: 32.6 G/DL (ref 31.5–36.5)
MCV RBC AUTO: 97 FL (ref 78–100)
MUCOUS THREADS #/AREA URNS LPF: PRESENT /LPF
NITRATE UR QL: NEGATIVE
PH UR STRIP: 6 PH (ref 5–7)
PLATELET # BLD AUTO: 178 10E9/L (ref 150–450)
POTASSIUM SERPL-SCNC: 3.6 MMOL/L (ref 3.4–5.3)
PROCALCITONIN SERPL-MCNC: 0.4 NG/ML
RBC # BLD AUTO: 3.62 10E12/L (ref 3.8–5.2)
RBC #/AREA URNS AUTO: 4 /HPF (ref 0–2)
SARS-COV-2 RNA SPEC QL NAA+PROBE: NEGATIVE
SARS-COV-2 RNA SPEC QL NAA+PROBE: NORMAL
SODIUM SERPL-SCNC: 138 MMOL/L (ref 133–144)
SOURCE: ABNORMAL
SP GR UR STRIP: 1.03 (ref 1–1.03)
SPECIMEN EXP DATE BLD: NORMAL
SPECIMEN SOURCE: NORMAL
UROBILINOGEN UR STRIP-MCNC: NORMAL MG/DL (ref 0–2)
WBC # BLD AUTO: 4.5 10E9/L (ref 4–11)
WBC #/AREA URNS AUTO: 1 /HPF (ref 0–5)

## 2020-09-26 PROCEDURE — 25000128 H RX IP 250 OP 636: Performed by: NURSE ANESTHETIST, CERTIFIED REGISTERED

## 2020-09-26 PROCEDURE — 25000128 H RX IP 250 OP 636: Performed by: OBSTETRICS & GYNECOLOGY

## 2020-09-26 PROCEDURE — 25800030 ZZH RX IP 258 OP 636: Performed by: ANESTHESIOLOGY

## 2020-09-26 PROCEDURE — 25000132 ZZH RX MED GY IP 250 OP 250 PS 637: Performed by: INTERNAL MEDICINE

## 2020-09-26 PROCEDURE — 71000012 ZZH RECOVERY PHASE 1 LEVEL 1 FIRST HR: Performed by: OBSTETRICS & GYNECOLOGY

## 2020-09-26 PROCEDURE — 88305 TISSUE EXAM BY PATHOLOGIST: CPT | Mod: 26 | Performed by: OBSTETRICS & GYNECOLOGY

## 2020-09-26 PROCEDURE — 25000125 ZZHC RX 250: Performed by: OBSTETRICS & GYNECOLOGY

## 2020-09-26 PROCEDURE — 86850 RBC ANTIBODY SCREEN: CPT | Performed by: OBSTETRICS & GYNECOLOGY

## 2020-09-26 PROCEDURE — 37000008 ZZH ANESTHESIA TECHNICAL FEE, 1ST 30 MIN: Performed by: OBSTETRICS & GYNECOLOGY

## 2020-09-26 PROCEDURE — 99253 IP/OBS CNSLTJ NEW/EST LOW 45: CPT | Mod: 57 | Performed by: OBSTETRICS & GYNECOLOGY

## 2020-09-26 PROCEDURE — 10D17ZZ EXTRACTION OF PRODUCTS OF CONCEPTION, RETAINED, VIA NATURAL OR ARTIFICIAL OPENING: ICD-10-PCS | Performed by: OBSTETRICS & GYNECOLOGY

## 2020-09-26 PROCEDURE — 80048 BASIC METABOLIC PNL TOTAL CA: CPT | Performed by: INTERNAL MEDICINE

## 2020-09-26 PROCEDURE — 81001 URINALYSIS AUTO W/SCOPE: CPT | Performed by: PHYSICIAN ASSISTANT

## 2020-09-26 PROCEDURE — 36000050 ZZH SURGERY LEVEL 2 1ST 30 MIN: Performed by: OBSTETRICS & GYNECOLOGY

## 2020-09-26 PROCEDURE — 88305 TISSUE EXAM BY PATHOLOGIST: CPT | Performed by: OBSTETRICS & GYNECOLOGY

## 2020-09-26 PROCEDURE — 87075 CULTR BACTERIA EXCEPT BLOOD: CPT | Performed by: OBSTETRICS & GYNECOLOGY

## 2020-09-26 PROCEDURE — 37000009 ZZH ANESTHESIA TECHNICAL FEE, EACH ADDTL 15 MIN: Performed by: OBSTETRICS & GYNECOLOGY

## 2020-09-26 PROCEDURE — 25000132 ZZH RX MED GY IP 250 OP 250 PS 637: Performed by: OBSTETRICS & GYNECOLOGY

## 2020-09-26 PROCEDURE — 40000985 US INTRAOPERATIVE: Mod: TC

## 2020-09-26 PROCEDURE — 84702 CHORIONIC GONADOTROPIN TEST: CPT | Performed by: INTERNAL MEDICINE

## 2020-09-26 PROCEDURE — 76705 ECHO EXAM OF ABDOMEN: CPT

## 2020-09-26 PROCEDURE — 86900 BLOOD TYPING SEROLOGIC ABO: CPT | Performed by: OBSTETRICS & GYNECOLOGY

## 2020-09-26 PROCEDURE — 12000000 ZZH R&B MED SURG/OB

## 2020-09-26 PROCEDURE — 93010 ELECTROCARDIOGRAM REPORT: CPT | Performed by: INTERNAL MEDICINE

## 2020-09-26 PROCEDURE — 36415 COLL VENOUS BLD VENIPUNCTURE: CPT | Performed by: OBSTETRICS & GYNECOLOGY

## 2020-09-26 PROCEDURE — 25800030 ZZH RX IP 258 OP 636: Performed by: INTERNAL MEDICINE

## 2020-09-26 PROCEDURE — 25000125 ZZHC RX 250: Performed by: NURSE ANESTHETIST, CERTIFIED REGISTERED

## 2020-09-26 PROCEDURE — 25000128 H RX IP 250 OP 636: Performed by: INTERNAL MEDICINE

## 2020-09-26 PROCEDURE — 99233 SBSQ HOSP IP/OBS HIGH 50: CPT | Performed by: HOSPITALIST

## 2020-09-26 PROCEDURE — 27210794 ZZH OR GENERAL SUPPLY STERILE: Performed by: OBSTETRICS & GYNECOLOGY

## 2020-09-26 PROCEDURE — 59830 TREAT UTERUS INFECTION: CPT | Performed by: OBSTETRICS & GYNECOLOGY

## 2020-09-26 PROCEDURE — 84145 PROCALCITONIN (PCT): CPT | Performed by: INTERNAL MEDICINE

## 2020-09-26 PROCEDURE — 40000306 ZZH STATISTIC PRE PROC ASSESS II: Performed by: OBSTETRICS & GYNECOLOGY

## 2020-09-26 PROCEDURE — 87070 CULTURE OTHR SPECIMN AEROBIC: CPT | Performed by: OBSTETRICS & GYNECOLOGY

## 2020-09-26 PROCEDURE — 36415 COLL VENOUS BLD VENIPUNCTURE: CPT | Performed by: INTERNAL MEDICINE

## 2020-09-26 PROCEDURE — 86140 C-REACTIVE PROTEIN: CPT | Performed by: INTERNAL MEDICINE

## 2020-09-26 PROCEDURE — 85027 COMPLETE CBC AUTOMATED: CPT | Performed by: INTERNAL MEDICINE

## 2020-09-26 PROCEDURE — 00000159 ZZHCL STATISTIC H-SEND OUTS PREP: Performed by: OBSTETRICS & GYNECOLOGY

## 2020-09-26 PROCEDURE — 86901 BLOOD TYPING SEROLOGIC RH(D): CPT | Performed by: OBSTETRICS & GYNECOLOGY

## 2020-09-26 RX ORDER — DOXYCYCLINE 100 MG/1
100 CAPSULE ORAL EVERY 12 HOURS SCHEDULED
Status: DISCONTINUED | OUTPATIENT
Start: 2020-09-26 | End: 2020-09-28 | Stop reason: HOSPADM

## 2020-09-26 RX ORDER — KETOROLAC TROMETHAMINE 30 MG/ML
INJECTION, SOLUTION INTRAMUSCULAR; INTRAVENOUS PRN
Status: DISCONTINUED | OUTPATIENT
Start: 2020-09-26 | End: 2020-09-26

## 2020-09-26 RX ORDER — NALOXONE HYDROCHLORIDE 0.4 MG/ML
.1-.4 INJECTION, SOLUTION INTRAMUSCULAR; INTRAVENOUS; SUBCUTANEOUS
Status: DISCONTINUED | OUTPATIENT
Start: 2020-09-26 | End: 2020-09-26 | Stop reason: HOSPADM

## 2020-09-26 RX ORDER — DEXAMETHASONE SODIUM PHOSPHATE 4 MG/ML
INJECTION, SOLUTION INTRA-ARTICULAR; INTRALESIONAL; INTRAMUSCULAR; INTRAVENOUS; SOFT TISSUE PRN
Status: DISCONTINUED | OUTPATIENT
Start: 2020-09-26 | End: 2020-09-26

## 2020-09-26 RX ORDER — METHYLERGONOVINE MALEATE 0.2 MG/1
200 TABLET ORAL 3 TIMES DAILY
Status: DISCONTINUED | OUTPATIENT
Start: 2020-09-26 | End: 2020-09-28 | Stop reason: HOSPADM

## 2020-09-26 RX ORDER — ONDANSETRON 4 MG/1
4 TABLET, ORALLY DISINTEGRATING ORAL EVERY 6 HOURS PRN
Status: DISCONTINUED | OUTPATIENT
Start: 2020-09-26 | End: 2020-09-28 | Stop reason: HOSPADM

## 2020-09-26 RX ORDER — PROPOFOL 10 MG/ML
INJECTION, EMULSION INTRAVENOUS PRN
Status: DISCONTINUED | OUTPATIENT
Start: 2020-09-26 | End: 2020-09-26

## 2020-09-26 RX ORDER — LIDOCAINE HYDROCHLORIDE 10 MG/ML
INJECTION, SOLUTION INFILTRATION; PERINEURAL PRN
Status: DISCONTINUED | OUTPATIENT
Start: 2020-09-26 | End: 2020-09-26

## 2020-09-26 RX ORDER — FENTANYL CITRATE 50 UG/ML
25-50 INJECTION, SOLUTION INTRAMUSCULAR; INTRAVENOUS
Status: DISCONTINUED | OUTPATIENT
Start: 2020-09-26 | End: 2020-09-26 | Stop reason: HOSPADM

## 2020-09-26 RX ORDER — LABETALOL 20 MG/4 ML (5 MG/ML) INTRAVENOUS SYRINGE
10
Status: DISCONTINUED | OUTPATIENT
Start: 2020-09-26 | End: 2020-09-26 | Stop reason: HOSPADM

## 2020-09-26 RX ORDER — SODIUM CHLORIDE, SODIUM LACTATE, POTASSIUM CHLORIDE, CALCIUM CHLORIDE 600; 310; 30; 20 MG/100ML; MG/100ML; MG/100ML; MG/100ML
INJECTION, SOLUTION INTRAVENOUS CONTINUOUS
Status: DISCONTINUED | OUTPATIENT
Start: 2020-09-26 | End: 2020-09-26 | Stop reason: HOSPADM

## 2020-09-26 RX ORDER — ACETAMINOPHEN 500 MG
1000 TABLET ORAL EVERY 6 HOURS PRN
Status: DISCONTINUED | OUTPATIENT
Start: 2020-09-26 | End: 2020-09-28 | Stop reason: HOSPADM

## 2020-09-26 RX ORDER — METHYLERGONOVINE MALEATE 0.2 MG/ML
INJECTION INTRAVENOUS PRN
Status: DISCONTINUED | OUTPATIENT
Start: 2020-09-26 | End: 2020-09-26

## 2020-09-26 RX ORDER — DOXYCYCLINE 100 MG/10ML
100 INJECTION, POWDER, LYOPHILIZED, FOR SOLUTION INTRAVENOUS EVERY 12 HOURS
Status: DISCONTINUED | OUTPATIENT
Start: 2020-09-26 | End: 2020-09-26

## 2020-09-26 RX ORDER — ONDANSETRON 2 MG/ML
4 INJECTION INTRAMUSCULAR; INTRAVENOUS EVERY 30 MIN PRN
Status: DISCONTINUED | OUTPATIENT
Start: 2020-09-26 | End: 2020-09-26 | Stop reason: HOSPADM

## 2020-09-26 RX ORDER — FEXOFENADINE HCL 180 MG/1
180 TABLET ORAL DAILY
COMMUNITY

## 2020-09-26 RX ORDER — AMOXICILLIN 250 MG
1 CAPSULE ORAL 2 TIMES DAILY PRN
Status: DISCONTINUED | OUTPATIENT
Start: 2020-09-26 | End: 2020-09-28 | Stop reason: HOSPADM

## 2020-09-26 RX ORDER — IBUPROFEN 600 MG/1
600 TABLET, FILM COATED ORAL EVERY 6 HOURS PRN
Status: DISCONTINUED | OUTPATIENT
Start: 2020-09-26 | End: 2020-09-28 | Stop reason: HOSPADM

## 2020-09-26 RX ORDER — CEFTRIAXONE 2 G/1
2 INJECTION, POWDER, FOR SOLUTION INTRAMUSCULAR; INTRAVENOUS EVERY 24 HOURS
Status: DISCONTINUED | OUTPATIENT
Start: 2020-09-26 | End: 2020-09-28 | Stop reason: HOSPADM

## 2020-09-26 RX ORDER — ONDANSETRON 2 MG/ML
4 INJECTION INTRAMUSCULAR; INTRAVENOUS EVERY 6 HOURS PRN
Status: DISCONTINUED | OUTPATIENT
Start: 2020-09-26 | End: 2020-09-28 | Stop reason: HOSPADM

## 2020-09-26 RX ORDER — HYDROMORPHONE HYDROCHLORIDE 1 MG/ML
.3-.5 INJECTION, SOLUTION INTRAMUSCULAR; INTRAVENOUS; SUBCUTANEOUS EVERY 10 MIN PRN
Status: DISCONTINUED | OUTPATIENT
Start: 2020-09-26 | End: 2020-09-26 | Stop reason: HOSPADM

## 2020-09-26 RX ORDER — LIDOCAINE 40 MG/G
CREAM TOPICAL
Status: DISCONTINUED | OUTPATIENT
Start: 2020-09-26 | End: 2020-09-28 | Stop reason: HOSPADM

## 2020-09-26 RX ORDER — ACETAMINOPHEN 325 MG/1
975 TABLET ORAL ONCE
Status: DISCONTINUED | OUTPATIENT
Start: 2020-09-26 | End: 2020-09-26 | Stop reason: HOSPADM

## 2020-09-26 RX ORDER — LIDOCAINE 40 MG/G
CREAM TOPICAL
Status: DISCONTINUED | OUTPATIENT
Start: 2020-09-26 | End: 2020-09-26 | Stop reason: HOSPADM

## 2020-09-26 RX ORDER — SODIUM CHLORIDE 9 MG/ML
INJECTION, SOLUTION INTRAVENOUS CONTINUOUS
Status: DISCONTINUED | OUTPATIENT
Start: 2020-09-26 | End: 2020-09-26

## 2020-09-26 RX ORDER — ONDANSETRON 4 MG/1
4 TABLET, ORALLY DISINTEGRATING ORAL EVERY 30 MIN PRN
Status: DISCONTINUED | OUTPATIENT
Start: 2020-09-26 | End: 2020-09-26 | Stop reason: HOSPADM

## 2020-09-26 RX ORDER — FENTANYL CITRATE 50 UG/ML
INJECTION, SOLUTION INTRAMUSCULAR; INTRAVENOUS PRN
Status: DISCONTINUED | OUTPATIENT
Start: 2020-09-26 | End: 2020-09-26

## 2020-09-26 RX ORDER — PRENATAL VIT/IRON FUM/FOLIC AC 27MG-0.8MG
1 TABLET ORAL DAILY
COMMUNITY

## 2020-09-26 RX ORDER — NALOXONE HYDROCHLORIDE 0.4 MG/ML
.1-.4 INJECTION, SOLUTION INTRAMUSCULAR; INTRAVENOUS; SUBCUTANEOUS
Status: DISCONTINUED | OUTPATIENT
Start: 2020-09-26 | End: 2020-09-28 | Stop reason: HOSPADM

## 2020-09-26 RX ORDER — ONDANSETRON 2 MG/ML
INJECTION INTRAMUSCULAR; INTRAVENOUS PRN
Status: DISCONTINUED | OUTPATIENT
Start: 2020-09-26 | End: 2020-09-26

## 2020-09-26 RX ORDER — ACETAMINOPHEN 325 MG/1
975 TABLET ORAL ONCE
Status: COMPLETED | OUTPATIENT
Start: 2020-09-26 | End: 2020-09-26

## 2020-09-26 RX ORDER — ALBUTEROL SULFATE 90 UG/1
2 AEROSOL, METERED RESPIRATORY (INHALATION) EVERY 6 HOURS PRN
COMMUNITY

## 2020-09-26 RX ORDER — AMOXICILLIN 250 MG
2 CAPSULE ORAL 2 TIMES DAILY PRN
Status: DISCONTINUED | OUTPATIENT
Start: 2020-09-26 | End: 2020-09-28 | Stop reason: HOSPADM

## 2020-09-26 RX ORDER — MEPERIDINE HYDROCHLORIDE 25 MG/ML
12.5 INJECTION INTRAMUSCULAR; INTRAVENOUS; SUBCUTANEOUS
Status: DISCONTINUED | OUTPATIENT
Start: 2020-09-26 | End: 2020-09-26 | Stop reason: HOSPADM

## 2020-09-26 RX ORDER — PRENATAL VIT/IRON FUM/FOLIC AC 27MG-0.8MG
1 TABLET ORAL DAILY
Status: DISCONTINUED | OUTPATIENT
Start: 2020-09-26 | End: 2020-09-28 | Stop reason: HOSPADM

## 2020-09-26 RX ORDER — GLYCOPYRROLATE 0.2 MG/ML
INJECTION, SOLUTION INTRAMUSCULAR; INTRAVENOUS PRN
Status: DISCONTINUED | OUTPATIENT
Start: 2020-09-26 | End: 2020-09-26

## 2020-09-26 RX ADMIN — PROPOFOL 200 MG: 10 INJECTION, EMULSION INTRAVENOUS at 17:00

## 2020-09-26 RX ADMIN — ONDANSETRON HYDROCHLORIDE 4 MG: 2 INJECTION, SOLUTION INTRAVENOUS at 17:05

## 2020-09-26 RX ADMIN — ACETAMINOPHEN 975 MG: 325 TABLET, FILM COATED ORAL at 16:35

## 2020-09-26 RX ADMIN — METHYLERGONOVINE MALEATE 200 MCG: 0.2 TABLET ORAL at 23:02

## 2020-09-26 RX ADMIN — METHYLERGONOVINE MALEATE 200 MCG: 0.2 INJECTION INTRAVENOUS at 17:18

## 2020-09-26 RX ADMIN — FENTANYL CITRATE 100 MCG: 50 INJECTION, SOLUTION INTRAMUSCULAR; INTRAVENOUS at 17:00

## 2020-09-26 RX ADMIN — ACETAMINOPHEN 1000 MG: 500 TABLET, FILM COATED ORAL at 22:08

## 2020-09-26 RX ADMIN — MIDAZOLAM 2 MG: 1 INJECTION INTRAMUSCULAR; INTRAVENOUS at 16:53

## 2020-09-26 RX ADMIN — DOXYCYCLINE 100 MG: 100 INJECTION, POWDER, LYOPHILIZED, FOR SOLUTION INTRAVENOUS at 11:33

## 2020-09-26 RX ADMIN — ONDANSETRON 4 MG: 4 TABLET, ORALLY DISINTEGRATING ORAL at 09:27

## 2020-09-26 RX ADMIN — LIDOCAINE HYDROCHLORIDE 30 MG: 10 INJECTION, SOLUTION INFILTRATION; PERINEURAL at 17:00

## 2020-09-26 RX ADMIN — DEXAMETHASONE SODIUM PHOSPHATE 4 MG: 4 INJECTION, SOLUTION INTRA-ARTICULAR; INTRALESIONAL; INTRAMUSCULAR; INTRAVENOUS; SOFT TISSUE at 17:00

## 2020-09-26 RX ADMIN — KETOROLAC TROMETHAMINE 30 MG: 30 INJECTION, SOLUTION INTRAMUSCULAR at 17:31

## 2020-09-26 RX ADMIN — SODIUM CHLORIDE: 9 INJECTION, SOLUTION INTRAVENOUS at 01:11

## 2020-09-26 RX ADMIN — SODIUM CHLORIDE: 9 INJECTION, SOLUTION INTRAVENOUS at 09:22

## 2020-09-26 RX ADMIN — DOXYCYCLINE HYCLATE 100 MG: 100 CAPSULE ORAL at 22:08

## 2020-09-26 RX ADMIN — SODIUM CHLORIDE, POTASSIUM CHLORIDE, SODIUM LACTATE AND CALCIUM CHLORIDE: 600; 310; 30; 20 INJECTION, SOLUTION INTRAVENOUS at 16:53

## 2020-09-26 RX ADMIN — PRENATAL VITAMINS-IRON FUMARATE 27 MG IRON-FOLIC ACID 0.8 MG TABLET 1 TABLET: at 09:22

## 2020-09-26 RX ADMIN — GLYCOPYRROLATE 0.2 MG: 0.2 INJECTION, SOLUTION INTRAMUSCULAR; INTRAVENOUS at 17:00

## 2020-09-26 RX ADMIN — CEFTRIAXONE 2 G: 2 INJECTION, POWDER, FOR SOLUTION INTRAMUSCULAR; INTRAVENOUS at 22:23

## 2020-09-26 SDOH — HEALTH STABILITY: MENTAL HEALTH: CURRENT SMOKER: 0

## 2020-09-26 ASSESSMENT — ACTIVITIES OF DAILY LIVING (ADL)
ADLS_ACUITY_SCORE: 10

## 2020-09-26 NOTE — ANESTHESIA PREPROCEDURE EVALUATION
Anesthesia Pre-Procedure Evaluation    Patient: Kiki Barker   MRN: [id number] : 1985          Preoperative Diagnosis: Missed  [O02.1]    Procedure(s):  DILATION AND CURETTAGE, UTERUS, USING SUCTION, WITH ULTRASOUND GUIDANCE    Past Medical History:   Diagnosis Date     Asthma      PFO (patent foramen ovale)      SVT (supraventricular tachycardia) (H)     seen by EP, no ablation needed     Past Surgical History:   Procedure Laterality Date     COLPOSCOPY       Anesthesia Evaluation     . Pt has had prior anesthetic. Type: General    No history of anesthetic complications          ROS/MED HX    ENT/Pulmonary:     (+)Intermittent asthma , . .    Neurologic:  - neg neurologic ROS     Cardiovascular:  - neg cardiovascular ROS       METS/Exercise Tolerance:     Hematologic:  - neg hematologic  ROS       Musculoskeletal:  - neg musculoskeletal ROS       GI/Hepatic:  - neg GI/hepatic ROS       Renal/Genitourinary:  - ROS Renal section negative       Endo:  - neg endo ROS       Psychiatric:  - neg psychiatric ROS       Infectious Disease:   (+) Other Infectious Disease       Malignancy:         Other:    (+) Possibly pregnant                         Physical Exam  Normal systems: cardiovascular, pulmonary and dental    Airway   Mallampati: II  TM distance: >3 FB  Neck ROM: full    Dental     Cardiovascular       Pulmonary             Lab Results   Component Value Date    WBC 4.5 2020    HGB 11.4 (L) 2020    HCT 35.0 2020     2020    CRP 42.8 (H) 2020     2020    POTASSIUM 3.6 2020    CHLORIDE 112 (H) 2020    CO2 22 2020    BUN 9 2020    CR 0.76 2020    GLC 89 2020    PAMELA 7.5 (L) 2020    ALBUMIN 3.9 2020    PROTTOTAL 7.9 2020    ALT 17 2020    AST 17 2020    ALKPHOS 60 2020    BILITOTAL 1.2 2020    LIPASE 167 2020    TSH 1.77 2019    HCGS Negative 2019       Preop  "Vitals  BP Readings from Last 3 Encounters:   09/26/20 124/80   09/25/20 125/81   09/28/19 115/84    Pulse Readings from Last 3 Encounters:   09/26/20 86   09/24/20 138   09/28/19 96      Resp Readings from Last 3 Encounters:   09/26/20 16   09/25/20 18   09/28/19 16    SpO2 Readings from Last 3 Encounters:   09/26/20 100%   09/25/20 100%   09/28/19 96%      Temp Readings from Last 1 Encounters:   09/26/20 97.8  F (36.6  C) (Temporal)    Ht Readings from Last 1 Encounters:   09/26/20 1.753 m (5' 9\")      Wt Readings from Last 1 Encounters:   09/26/20 73.5 kg (162 lb 1.6 oz)    Estimated body mass index is 23.94 kg/m  as calculated from the following:    Height as of this encounter: 1.753 m (5' 9\").    Weight as of this encounter: 73.5 kg (162 lb 1.6 oz).       Anesthesia Plan      History & Physical Review  History and physical reviewed and following examination; no interval change.    ASA Status:  2 .    NPO Status:  > 8 hours    Plan for General with Intravenous induction. Maintenance will be Balanced.    PONV prophylaxis:  Ondansetron (or other 5HT-3) and Dexamethasone or Solumedrol    The patient is not a current smoker      Postoperative Care  Postoperative pain management:  IV analgesics, Oral pain medications and Multi-modal analgesia.      Consents  Anesthetic plan, risks, benefits and alternatives discussed with:  Patient..                 Christopher Watson MD                    .  "

## 2020-09-26 NOTE — PROGRESS NOTES
[facility]    Hospitalist Progress Note    Date of Service (when I saw the patient): 2020  Provider:  Sriram Rice MD   Text Page  7am - 6PM       Assessment & Plan   Kiki Barker is a 35 year old female who was admitted on 2020. She presents with three days of fevers up to 101.5  F at home with intermittent shaking chills, myalgias, nausea, mild epigastric discomfort described as burning, and decreased p.o. intake. Positive pregnancy test with vaginal spotting.    Principal Problem:  1. Spontaneous  with septicemia    Assessment: non viable pregnancy per OB Gyn following HCG curve, recent uterine bleeding, fever chills etc    Plan:  To OR today for curettage and aspiration.        2. Bacteremia GNR responding to Rocephin       3. Covid 19 negative pcr test.       4. History SVT, PFO: History of PFO.  Also, history of SVT and was seen by EP, but did not need ablation.  Brother has WPW.        5. H/O Asthma:  has albuterol inhaler as needed at baseline.  No sign of acute exacerbation    DVT Prophylaxis: Pneumatic Compression Devices  Code Status: Full Code    Disposition: Expected discharge in24 if not complications.    Interval History   No fever, nausea or vomiting. Not in pain.     -Data reviewed today: I reviewed all new labs and imaging results over the last 24 hours.     Physical Exam   Temp: 97.3  F (36.3  C) Temp src: Temporal BP: 121/75 Pulse: 65   Resp: 16 SpO2: 100 % O2 Device: None (Room air)    Vitals:    20 0045   Weight: 73.5 kg (162 lb 1.6 oz)     Vital Signs with Ranges  Temp:  [97.3  F (36.3  C)-100  F (37.8  C)] 97.3  F (36.3  C)  Pulse:  [] 65  Resp:  [16] 16  BP: (111-135)/(69-99) 121/75  SpO2:  [98 %-100 %] 100 %  I/O last 3 completed shifts:  In: 701.67 [P.O.:120; I.V.:581.67]  Out: -     GEN:  Alert, oriented x 3, appears comfortable, NAD.  HEENT:  Normocephalic/atraumatic, no scleral icterus, no nasal discharge, mouth moist.  CV:   Regular rate and rhythm, no murmur or JVD.  S1 + S2 noted, no S3 or S4.  LUNGS:  Clear to auscultation bilaterally without rales/rhonchi/wheezing/retractions.  Symmetric chest rise on inhalation noted.  ABD:  Active bowel sounds, soft, non-tender/non-distended.  No rebound/guarding/rigidity.  EXT:  No edema or cyanosis.  No joint synovitis noted.  SKIN:  Dry to touch, no exanthems noted in the visualized areas.       Medications     sodium chloride 100 mL/hr at 09/26/20 0922       [Auto Hold] cefTRIAXone  2 g Intravenous Q24H     [Auto Hold] doxycycline (VIBRAMYCIN) IV  100 mg Intravenous Q12H     [START ON 9/27/2020] influenza quadrivalent (PF) vacc  0.5 mL Intramuscular Prior to discharge     [Auto Hold] prenatal multivitamin w/iron  1 tablet Oral Daily     [Auto Hold] sodium chloride (PF)  3 mL Intracatheter Q8H       Data   Recent Labs   Lab 09/26/20  0734 09/25/20  2229 09/24/20  2126   WBC 4.5 6.2 15.1*   HGB 11.4* 13.8 13.5   MCV 97 95 96    218 243    137 135   POTASSIUM 3.6 3.5 3.4   CHLORIDE 112* 108 105   CO2 22 24 23   BUN 9 12 16   CR 0.76 0.78 0.81   ANIONGAP 4 5 7   PAMELA 7.5* 8.8 9.3   GLC 89 90 108*   ALBUMIN  --  3.9 4.2   PROTTOTAL  --  7.9 7.7   BILITOTAL  --  1.2 1.9*   ALKPHOS  --  60 61   ALT  --  17 14   AST  --  17 17   LIPASE  --  167  --        Recent Results (from the past 24 hour(s))   US Abdomen Limited    Narrative    EXAM: US ABDOMEN LIMITED  LOCATION: [facility]  DATE/TIME: 9/26/2020 1:22 AM    INDICATION: Upper abdominal pain and fever  COMPARISON: None.  TECHNIQUE: Limited abdominal ultrasound.    FINDINGS:    GALLBLADDER: No visible cholelithiasis or wall thickening. Small nonmobile echogenic foci presumably polyps measuring up to 4 mm. Sonographic Wells's sign negative.    BILE DUCTS: No biliary dilatation. The common duct measures 6 mm.    LIVER: Grossly within normal limits where seen.    RIGHT KIDNEY: No hydronephrosis.    PANCREAS: Partial  observation by bowel gas.    No visible ascites.      Impression    IMPRESSION:  1.  No visible cholelithiasis or biliary dilatation. Sonographic Wells's sign negative.  2.  Small polyps. 1 year follow-up recommended.             Disclaimer: This note consists of symbols derived from keyboarding, dictation and/or voice recognition software. As a result, there may be errors in the script that have gone undetected. Please consider this when interpreting information found in this chart.

## 2020-09-26 NOTE — PHARMACY-ADMISSION MEDICATION HISTORY
Admission medication history interview status for this patient is complete. See [facility] admission navigator for allergy information, prior to admission medications and immunization status.     Medication history interview done via telephone during Covid-19 pandemic, indicate source(s): Patient  Medication history resources (including written lists, pill bottles, clinic record):None  Pharmacy: [facility]    Changes made to PTA medication list:  Added: all meds  Deleted:   Changed:     Actions taken by pharmacist (provider contacted, etc):Sticky note/page md     Additional medication history information:None    Medication reconciliation/reorder completed by provider prior to medication history?  N   (Y/N)     For patients on insulin therapy: NA    Prior to Admission medications    Medication Sig Last Dose Taking? Auth Provider   albuterol (PROAIR HFA/PROVENTIL HFA/VENTOLIN HFA) 108 (90 Base) MCG/ACT inhaler Inhale 2 puffs into the lungs every 6 hours as needed for shortness of breath / dyspnea or wheezing  Yes Unknown, Entered By History   fexofenadine (ALLEGRA) 180 MG tablet Take 180 mg by mouth daily 9/25/2020 at Unknown time Yes Unknown, Entered By History   Prenatal Vit-Fe Fumarate-FA (PRENATAL MULTIVITAMIN W/IRON) 27-0.8 MG tablet Take 1 tablet by mouth daily 9/25/2020 at Unknown time Yes Unknown, Entered By History

## 2020-09-26 NOTE — ANESTHESIA CARE TRANSFER NOTE
Patient: Kiki Barker    Procedure(s):  DILATION AND CURETTAGE, UTERUS, USING SUCTION, WITH ULTRASOUND GUIDANCE    Diagnosis: Missed  [O02.1]  Diagnosis Additional Information: No value filed.    Anesthesia Type:   General     Note:  Airway :Face Mask  Patient transferred to:PACU  Comments: To PACU, report to RN, oxygen per face mask.      Vitals: (Last set prior to Anesthesia Care Transfer)    CRNA VITALS  2020 1656 - 2020 1732      2020             NIBP:  (!) 87/52    NIBP Mean:  61                Electronically Signed By: CORTNEY Stein CRNA  2020  5:32 PM

## 2020-09-26 NOTE — ED PROVIDER NOTES
2031: I was notified that the patient's blood culture returned positive with gram-negative rods, likely not a contaminant.  Fortunately patient received ceftriaxone prior to discharge.  I called the patient and notified her of the abnormal result and recommended that she return to the emergency department for admission to the hospital for IV abx.  Patient was agreeable stating that she would return.  She reports that she still feels ill but somewhat better than she did upon her initial ED presentation.         Jayme Smart MD  09/25/20 2032

## 2020-09-26 NOTE — ED NOTES
RECEIVING UNIT ED HANDOFF REVIEW    Above ED Nurse Handoff Report was reviewed: Yes  Reviewed by: Mary Moreno RN on September 26, 2020 at 12:26 AM   [facility]  ED Nurse Handoff Report    Kiki Barker is a 35 year old female   ED Chief complaint: Fever and Abnormal Labs  . ED Diagnosis:   Final diagnoses:   Bacteremia     Allergies:   Allergies   Allergen Reactions     Neomycin        Code Status: Full Code  Activity level - Baseline/Home:  Independent. Activity Level - Current:   Independent. Lift room needed: No. Bariatric: No   Needed: No   Isolation: No. Infection: Not Applicable.     Vital Signs:   Vitals:    09/25/20 2128   BP: (!) 135/99   Pulse: 113   Resp: 16   Temp: 100  F (37.8  C)   TempSrc: Oral   SpO2: 100%       Cardiac Rhythm:  ,      Pain level:    Patient confused: No. Patient Falls Risk: No.   Elimination Status: Has not voided in the ED at this time.    Patient Report - Initial Complaint: Fever and vag bleeding since yesterday. Patient is 3-4 weeks pregnant.  Called and told to return to ER for IV abx d/t positive blood cultures. Focused Assessment:   Neurological Upatoi Coma Scale - Best Eye Response: 4-->(E4) spontaneous  Best Motor Response: 6-->(M6) obeys commands  Best Verbal Response: 5-->(V5) oriented  Upatoi Coma Scale Score: 15  ES     22:42 Respiratory Respiratory - Respiratory WDL:  (No sign of distress. ABCs intact. Patient called back for positive blood cultures. )       Tests Performed: labs, UA. Abnormal Results:   Labs Ordered and Resulted from Time of ED Arrival Up to the Time of Departure from the ED   CBC WITH PLATELETS DIFFERENTIAL   COMPREHENSIVE METABOLIC PANEL   LIPASE   LACTIC ACID WHOLE BLOOD   HCG QUANTITATIVE PREGNANCY   UA MACROSCOPIC WITH REFLEX TO MICRO AND CULTURE   COVID-19 VIRUS (CORONAVIRUS) BY PCR   BLOOD CULTURE   BLOOD CULTURE   .   Treatments provided: fluids, Tylenol  Family Comments: no family in the ED at this time  OBS  brochure/video discussed/provided to patient:  No  ED Medications:   Medications   0.9% sodium chloride BOLUS (1,000 mLs Intravenous New Bag 9/25/20 2226)     Followed by   0.9% sodium chloride BOLUS (1,000 mLs Intravenous New Bag 9/25/20 2227)     Followed by   sodium chloride 0.9% infusion (has no administration in time range)   acetaminophen (TYLENOL) tablet 1,000 mg (1,000 mg Oral Given 9/25/20 2227)     Drips infusing:  No  For the majority of the shift, the patient's behavior Green. Interventions performed were none.    Sepsis treatment initiated: No     Patient tested for COVID 19 prior to admission: YES    ED Nurse Name/Phone Number: Rena Ariza RN,   10:45 PM

## 2020-09-26 NOTE — CONSULTS
"  2020    Kiki Barker  [id number]            OB Consultation        I was asked to see this Pt in consultation by Juan J Vinson MD for bacteria in early pregnancy.    She presented to the ER 2020 w/ sudden onset of bright red VB, along with low back pain and fever/chills to 101.5 at home.  She knew she was early pregnant at the time but wasn't scheduled for her first OB visit for another 4-5 weeks.  Pt was pan-cultured, had a WBC 15.1, normal lactic acid, HCG 1631, COVID Neg.  Renal U/S WNL.  OB U/S showed a single IUP sac, c/w 5w0d, no fetal pole, no FHTs, no yolk sac, 3 small cystic foci in myometrium of uncertain significance, largest 1 x 7 x 4 mm.  She got one dose of Ceftriaxone and felt better so she was d/c'd home.  Then last night her blood culture was positive for Gram Neg Rods, so Pt was advised to come back to ER for admission and was placed back on Cetriaxone.  She is on Abx Day #3 today.  She continues to have intermittent VB, no cramps.  Her Quant HCG initially went up a little to 2591 (2020 at 10:30PM) but has plateaued and is not 2657 (7:30AM today).  Her WBC is now down to 4.5, Hgb 11.4.  Abd U/S Neg.    Patient's last menstrual period was 2020.     Estimated body mass index is 23.94 kg/m  as calculated from the following:    Height as of this encounter: 1.753 m (5' 9\").    Weight as of this encounter: 73.5 kg (162 lb 1.6 oz).    Rubella Immune, RH Positive         She is a 35 year old   Her OB history:   OB History    Para Term  AB Living   1 0 0 0 0 0   SAB TAB Ectopic Multiple Live Births   0 0 0 0 0      # Outcome Date GA Lbr Александр/2nd Weight Sex Delivery Anes PTL Lv   1 Current                     Past Medical History:   Diagnosis Date     Asthma      PFO (patent foramen ovale)      SVT (supraventricular tachycardia) (H)     seen by EP, no ablation needed          Past Surgical History:   Procedure Laterality Date     COLPOSCOPY           No current " "outpatient medications on file.       Allergies: Neomycin      REVIEW OF SYSTEMS:  NEUROLOGIC:  Negative  EYES:  Negative  ENT:  Negative  GI:  Negative  :  As above  GYN:  Negative  CV:  Negative  PULMONARY:  Negative  MUSCULOSKELETAL:  Negative  PSYCH:  Negative        Social History     Socioeconomic History     Marital status: Single     Spouse name: Not on file     Number of children: Not on file     Years of education: Not on file     Highest education level: Not on file   Occupational History     Not on file   Social Needs     Financial resource strain: Not on file     Food insecurity     Worry: Not on file     Inability: Not on file     Transportation needs     Medical: Not on file     Non-medical: Not on file   Tobacco Use     Smoking status: Never Smoker   Substance and Sexual Activity     Alcohol use: Not Currently     Drug use: Not on file     Sexual activity: Not on file   Lifestyle     Physical activity     Days per week: Not on file     Minutes per session: Not on file     Stress: Not on file   Relationships     Social connections     Talks on phone: Not on file     Gets together: Not on file     Attends Confucianism service: Not on file     Active member of club or organization: Not on file     Attends meetings of clubs or organizations: Not on file     Relationship status: Not on file     Intimate partner violence     Fear of current or ex partner: Not on file     Emotionally abused: Not on file     Physically abused: Not on file     Forced sexual activity: Not on file   Other Topics Concern     Not on file   Social History Narrative     Not on file      Family History   Problem Relation Age of Onset     Heart Disease Mother      Cerebrovascular Disease Mother      Factor V Leiden deficiency Mother      Marcial Parkinson White syndrome Brother          Exam:    Vitals:   /80 (BP Location: Right arm)   Pulse 86   Temp 97.8  F (36.6  C) (Temporal)   Resp 16   Ht 1.753 m (5' 9\")   Wt 73.5 kg " (162 lb 1.6 oz)   LMP 08/16/2020   SpO2 100%   BMI 23.94 kg/m        Gen- Alert Awake in NAD  HEENT grossly normal  Neck: no lymphadenopathy or thryoidomegaly  Lungs CTAB  Back No CVAT  Heart RR  ABD ND, NABS, soft, NT, no masses  Pelvic deferred to OR  EXT:  No edema, no calf tenderness    Data:  Results for orders placed or performed during the hospital encounter of 09/25/20   US Abdomen Limited     Status: None    Narrative    EXAM: US ABDOMEN LIMITED  LOCATION: [facility]  DATE/TIME: 9/26/2020 1:22 AM    INDICATION: Upper abdominal pain and fever  COMPARISON: None.  TECHNIQUE: Limited abdominal ultrasound.    FINDINGS:    GALLBLADDER: No visible cholelithiasis or wall thickening. Small nonmobile echogenic foci presumably polyps measuring up to 4 mm. Sonographic Wells's sign negative.    BILE DUCTS: No biliary dilatation. The common duct measures 6 mm.    LIVER: Grossly within normal limits where seen.    RIGHT KIDNEY: No hydronephrosis.    PANCREAS: Partial observation by bowel gas.    No visible ascites.      Impression    IMPRESSION:  1.  No visible cholelithiasis or biliary dilatation. Sonographic Wells's sign negative.  2.  Small polyps. 1 year follow-up recommended.   CBC with platelets differential     Status: None   Result Value Ref Range    WBC 6.2 4.0 - 11.0 10e9/L    RBC Count 4.32 3.8 - 5.2 10e12/L    Hemoglobin 13.8 11.7 - 15.7 g/dL    Hematocrit 41.2 35.0 - 47.0 %    MCV 95 78 - 100 fl    MCH 31.9 26.5 - 33.0 pg    MCHC 33.5 31.5 - 36.5 g/dL    RDW 11.9 10.0 - 15.0 %    Platelet Count 218 150 - 450 10e9/L    Diff Method Automated Method     % Neutrophils 75.2 %    % Lymphocytes 16.5 %    % Monocytes 7.3 %    % Eosinophils 0.3 %    % Basophils 0.5 %    % Immature Granulocytes 0.2 %    Nucleated RBCs 0 0 /100    Absolute Neutrophil 4.7 1.6 - 8.3 10e9/L    Absolute Lymphocytes 1.0 0.8 - 5.3 10e9/L    Absolute Monocytes 0.5 0.0 - 1.3 10e9/L    Absolute Eosinophils 0.0 0.0 - 0.7 10e9/L     Absolute Basophils 0.0 0.0 - 0.2 10e9/L    Abs Immature Granulocytes 0.0 0 - 0.4 10e9/L    Absolute Nucleated RBC 0.0    Comprehensive metabolic panel     Status: None   Result Value Ref Range    Sodium 137 133 - 144 mmol/L    Potassium 3.5 3.4 - 5.3 mmol/L    Chloride 108 94 - 109 mmol/L    Carbon Dioxide 24 20 - 32 mmol/L    Anion Gap 5 3 - 14 mmol/L    Glucose 90 70 - 99 mg/dL    Urea Nitrogen 12 7 - 30 mg/dL    Creatinine 0.78 0.52 - 1.04 mg/dL    GFR Estimate >90 >60 mL/min/[1.73_m2]    GFR Estimate If Black >90 >60 mL/min/[1.73_m2]    Calcium 8.8 8.5 - 10.1 mg/dL    Bilirubin Total 1.2 0.2 - 1.3 mg/dL    Albumin 3.9 3.4 - 5.0 g/dL    Protein Total 7.9 6.8 - 8.8 g/dL    Alkaline Phosphatase 60 40 - 150 U/L    ALT 17 0 - 50 U/L    AST 17 0 - 45 U/L   Lipase     Status: None   Result Value Ref Range    Lipase 167 73 - 393 U/L   Lactic acid whole blood     Status: None   Result Value Ref Range    Lactic Acid 0.9 0.7 - 2.0 mmol/L   UA reflex to Microscopic and Culture     Status: Abnormal    Specimen: Unspecified Urine   Result Value Ref Range    Color Urine Light Yellow     Appearance Urine Clear     Glucose Urine Negative NEG^Negative mg/dL    Bilirubin Urine Negative NEG^Negative    Ketones Urine 150 (A) NEG^Negative mg/dL    Specific Gravity Urine 1.027 1.003 - 1.035    Blood Urine Negative NEG^Negative    pH Urine 6.0 5.0 - 7.0 pH    Protein Albumin Urine 10 (A) NEG^Negative mg/dL    Urobilinogen mg/dL Normal 0.0 - 2.0 mg/dL    Nitrite Urine Negative NEG^Negative    Leukocyte Esterase Urine Negative NEG^Negative    Source Unspecified Urine     RBC Urine 4 (H) 0 - 2 /HPF    WBC Urine 1 0 - 5 /HPF    Mucous Urine Present (A) NEG^Negative /LPF   Symptomatic COVID-19 Virus (Coronavirus) by PCR     Status: None    Specimen: Nasopharyngeal   Result Value Ref Range    COVID-19 Virus PCR to [facility] - Source Nasopharyngeal     COVID-19 Virus PCR to [facility] - Result       Test received-See reflex to IDDL test SARS  CoV2 (COVID-19) Virus RT-PCR   HCG quantitative pregnancy     Status: Abnormal   Result Value Ref Range    HCG Quantitative Serum 2,591 (H) 0 - 5 IU/L   CBC with platelets     Status: Abnormal   Result Value Ref Range    WBC 4.5 4.0 - 11.0 10e9/L    RBC Count 3.62 (L) 3.8 - 5.2 10e12/L    Hemoglobin 11.4 (L) 11.7 - 15.7 g/dL    Hematocrit 35.0 35.0 - 47.0 %    MCV 97 78 - 100 fl    MCH 31.5 26.5 - 33.0 pg    MCHC 32.6 31.5 - 36.5 g/dL    RDW 11.9 10.0 - 15.0 %    Platelet Count 178 150 - 450 10e9/L   Basic metabolic panel     Status: Abnormal   Result Value Ref Range    Sodium 138 133 - 144 mmol/L    Potassium 3.6 3.4 - 5.3 mmol/L    Chloride 112 (H) 94 - 109 mmol/L    Carbon Dioxide 22 20 - 32 mmol/L    Anion Gap 4 3 - 14 mmol/L    Glucose 89 70 - 99 mg/dL    Urea Nitrogen 9 7 - 30 mg/dL    Creatinine 0.76 0.52 - 1.04 mg/dL    GFR Estimate >90 >60 mL/min/[1.73_m2]    GFR Estimate If Black >90 >60 mL/min/[1.73_m2]    Calcium 7.5 (L) 8.5 - 10.1 mg/dL   SARS-CoV-2 COVID-19 Virus (Coronavirus) RT-PCR Nasopharyngeal     Status: None    Specimen: Nasopharyngeal   Result Value Ref Range    SARS-CoV-2 Virus Specimen Source Nasopharyngeal     SARS-CoV-2 PCR Result NEGATIVE     SARS-CoV-2 PCR Comment       Testing was performed using the Aptima SARS-CoV-2 Assay on the [email or web address] Instrument System.   Additional information about this Emergency Use Authorization (EUA) assay can be found via   the Lab Guide.     HCG quantitative pregnancy     Status: Abnormal   Result Value Ref Range    HCG Quantitative Serum 2,657 (H) 0 - 5 IU/L   Blood culture     Status: None (Preliminary result)    Specimen: Blood    Left Arm   Result Value Ref Range    Specimen Description Blood Left Arm     Culture Micro No growth after 5 hours    Blood culture     Status: None (Preliminary result)    Specimen: Blood    Left Hand   Result Value Ref Range    Specimen Description Blood Left Hand     Culture Micro No growth after 5 hours          Assessment:     1)  Septic  - Dx based on VB in early pregnancy with abnormally rising HCGs combined with U/S showing no fetal pole or yolk sac in 5 week sac, plus fever and leukocytosis 2 days ago along with Gram Neg bacteremia with no other source.    Plan:    1)  I reviewed Dx w/ Pt and recommendation for suction D&C under U/S guidance after 8 hours NPO status, which would be approx 5PM today.    2)  She understands the indications/risks/benefits/alternatives of the proposed procedure and has given informed consent.    3)  May continue Ceftriaxone since she does seem to be responding, add Doxycycline 100 mg IV Q 12 hours.    4)  Will need to continue IV Abx for 24 hours postop, then assuming remains afebrile plan to discharge home on Doxycycline 100 mg BID to complete 14 day course.      Gray Duran MD  2020

## 2020-09-26 NOTE — ED TRIAGE NOTES
Fever and vag bleeding since yesterday.  Called and told to return to ER for IV abx d/t positive blood cultures.

## 2020-09-26 NOTE — PLAN OF CARE
Patient arrived on floor at 1830. Alert and oriented. Afebrile. Tolerating clear liquids diet. Rates abdominal cramping 1/10; declines medications at this time. On IV Rocephin and Doxycycline. Will continue to monitor.

## 2020-09-26 NOTE — H&P
[facility]  Hospitalist Admission Note  Name: Kiki Barker    MRN: [id number]  YOB: 1985    Age: 35 year old  Date of admission: 9/25/2020  Primary care provider: Olivia Lieberman    Chief Complaint:  Fever    Assessment and Plan:   GNR bacteremia: 3 days of fevers up to 101.5  F at home with intermittent shaking chills, myalgias, nausea, mild epigastric discomfort described as burning, and decreased p.o. intake.  Has had some vaginal spotting and is 5 weeks pregnant.  Leukocytosis of 15.1 then, now 6.2.  1 of 2 blood culture positive for GNR.  Urinalysis with no pyuria or concerning findings yesterday.  Renal ultrasound does not show any stone, obstruction, pyelonephritis.  Has history of endometritis after pregnancy.  She does report having history of gastric polyps and multiple family members with previous cholecystectomy.  With her epigastric burning discomfort and positive GNR bacteremia with other source this could be acute cholecystitis needs to be evaluated for.  She does not have any focal right upper quadrant tenderness on exam.  LFTs are normal.  Received 2 L NS and 2 g IV ceftriaxone in the ER.  COVID19 PCR negative yesterday.  -Repeat symptomatic COVID19 ordered from the ER, this is very unlikely given negative test yesterday and GNR bacteremia explain symptoms.  Isolation until back, then remove if negative  -IV ceftriaxone 2 g every 24 hours  -Obtain abdominal ultrasound to look for cholecystitis, if positive will need general surgery consult order placed  -Repeating UA/UC and blood cultures  -Zofran as needed for nausea  -Acetaminophen for pain  -IV normal saline    Pregnancy: 5 weeks pregnant by US and serum HCG.  -prenatal vitamin  -Now seeing OB at [facility].  Consult OB while here given unclear source of bacteremia and new pregnancy.    History SVT, PFO: History of PFO.  Also, history of SVT and was seen by EP, but did not need ablation.  Brother has  WPW.    Asthma:  has albuterol inhaler as needed at baseline.  No sign of acute exacerbation.    DVT Prophylaxis: Pneumatic Compression Devices  Code Status: Full Code  FEN: regular diet, NS at 100 ml/hr  Discharge Dispo: home  Estimated Disch Date / # of Days until Disch: admit patient for bacteremia of unclear source in the setting of new pregnancy.  Anticipate 2 night hospitalization for IV antibiotics to make sure improving      History of Present Illness:  Kiki Barker is a 35 year old female with PMH including asthma, PFO, and NSVT who is currently 5 weeks pregnant and is presenting with fever.  Patient was actually seen yesterday in the ER and had lab work done at that time.  She returns for 1/2 blood cultures positive for GNRs.  She reports proximally 3 days of intermittent fevers and shaking chills.  Temperature 1 1.5  F at home.  Chills were yesterday.  She has had some nausea without vomiting.  Describes some mild epigastric pain, burning in quality that is new.  She is a decreased p.o. intake due to the nausea.  Has not had pain like this before although does report history of gallbladder polyps.  She denies any dysuria, hematuria, or urinary frequency.  Does have some diffuse back pain, but no localized flank pain.  She is now 5 weeks pregnant and has been having some vaginal spotting.  Has had previous with endometritis after previous births.  On review of systems she denies any cough, chest pain, shortness of breath, sinus pain, rhinorrhea, otalgia, vomiting, diarrhea, rash.  Denies any sick contacts or contact with any with COVID19.  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    History obtained from patient, medical record, and from BECCA Aguilar in the emergency department.  Blood pressure 135/99, heart rate 113 then down to 86, temperature 100.0  F, oxygen 100% on room air.  Leukocytosis yesterday 15.1 is now 6.2.  Hemoglobin 13.8 and platelet count 218.  CMP within normal  limits.  Serum hCG 1631.  COVID19 PCR from yesterday is negative.  Lactic acid is 0.9.  Urinalysis from yesterday showing <1 WBC/RBC, negative LE, negative nitrite.  Renal ultrasound normal.  She received 2 L normal saline, 1 g acetaminophen, and 2 g IV ceftriaxone in the ER.  Admit inpatient for bacteremia in a patient who is 5 weeks pregnant.  Unclear source obtaining abdominal ultrasound to look for cholecystitis.     Past Medical History reviewed:  Past Medical History:   Diagnosis Date     Asthma      PFO (patent foramen ovale)      SVT (supraventricular tachycardia) (H)     seen by EP, no ablation needed     Past Surgical History reviewed:  Past Surgical History:   Procedure Laterality Date     COLPOSCOPY       Social History reviewed:  Social History     Tobacco Use     Smoking status: Never Smoker   Substance Use Topics     Alcohol use: Not Currently     Social History     Social History Narrative     Not on file     Family History reviewed:  Family History   Problem Relation Age of Onset     Heart Disease Mother      Cerebrovascular Disease Mother      Factor V Leiden deficiency Mother      Marcial Parkinson White syndrome Brother      Allergies:  Allergies   Allergen Reactions     Neomycin      Medications:  Prenatal vitamin  Albuterol HFA    Review of Systems:  A Comprehensive greater than 10 system review of systems was carried out.  Pertinent positives and negatives are noted above.  Otherwise negative.     Physical Exam:  Blood pressure 113/75, pulse 92, temperature 100  F (37.8  C), temperature source Oral, resp. rate 16, last menstrual period 08/16/2020, SpO2 100 %.  Wt Readings from Last 1 Encounters:   No data found for Wt     Exam:  Constitutional: Awake, NAD   Eyes: sclera white   HEENT: atraumatic, MMM  Respiratory: no respiratory distress, lungs cta bilaterally, no crackles or wheeze  Cardiovascular: Regular tachycardia without murmur  GI: No significant tenderness to palpation in all quadrants,  not distended, bowel sounds present  Skin: no rash or lesions, acyanotic  Musculoskeletal/extremities: atraumatic, no major deformities. No edema  Neurologic: A&O, speech clear, moves extremities equally  Psychiatric: calm, cooperative, normal affect    Lab and imaging data personally reviewed:  Labs:  Recent Labs   Lab 09/25/20 2229 09/24/20 2126   WBC 6.2 15.1*   HGB 13.8 13.5   HCT 41.2 40.2   MCV 95 96    243     Recent Labs   Lab 09/24/20 2230 09/24/20 2210 09/24/20 2200   CULT Culture in progress Cultured on the 1st day of incubation:  Gram negative rods  *  Critical Value/Significant Value, preliminary result only, called to and read back by  Nathaniel Bartholomew RN 2019 9/25/20 AM    (Note)  NEGATIVE for the following organisms and resistance markers:  Acinetobacter sp., Citrobacter sp., Enterobacter sp., Proteus sp., E.  coli, K. pneumoniae/oxytoca, P. aeruginosa, CTX-M, KPC, NDM, VIM, IMP  and OXA by [employer] multiplex nucleic acid test. Final identification  and antimicrobial susceptibility testing will be verified by standard  methods.    Critical Value/Significant Value called to and read back by  So Ware RN 2231 9/25/20 AM     No growth after 16 hours     Recent Labs   Lab 09/25/20 2229 09/24/20 2126    135   POTASSIUM 3.5 3.4   CHLORIDE 108 105   CO2 24 23   ANIONGAP 5 7   GLC 90 108*   BUN 12 16   CR 0.78 0.81   GFRESTIMATED >90 >90   GFRESTBLACK >90 >90   PAMELA 8.8 9.3   PROTTOTAL 7.9 7.7   ALBUMIN 3.9 4.2   BILITOTAL 1.2 1.9*   ALKPHOS 60 61   AST 17 17   ALT 17 14     Recent Labs   Lab 09/24/20 2327   COLOR Straw   APPEARANCE Clear   URINEGLC Negative   URINEBILI Negative   URINEKETONE Negative   SG 1.008   UBLD Negative   URINEPH 6.5   PROTEIN Negative   NITRITE Negative   LEUKEST Negative   RBCU <1   WBCU <1     Serum HCG 1631    Imaging:  Recent Results (from the past 24 hour(s))   US Renal Complete    Narrative    EXAM: US RENAL COMPLETE  LOCATION: [facility]  DATE/TIME: 9/24/2020 11:57 PM    INDICATION: Abdominal pain. Pyelonephritis. Pregnant.  COMPARISON: None.  TECHNIQUE: Routine Bilateral Renal and Bladder Ultrasound.    FINDINGS:    RIGHT KIDNEY: 11.1 x 6.6 x 4.9 cm. Normal without hydronephrosis or masses.     LEFT KIDNEY: 12 x 5.9 x 5.6 cm. Normal without hydronephrosis or masses.     BLADDER: Normal.      Impression    IMPRESSION:  1.  Normal kidney ultrasound.   US OB 1st Trimester W Transvaginal W Doppler    Narrative    EXAM: US OB 1ST TRIMESTER W TRANSVAGINAL W DOPPLER  LOCATION: [facility]  DATE/TIME: 9/24/2020 11:58 PM    INDICATION: Back pain and fever. Early pregnancy.  COMPARISON: None.  TECHNIQUE: Transabdominal scans were performed. Endovaginal ultrasound was performed to better visualize the embryo.    FINDINGS:  UTERUS: Single normal appearing intrauterine gestation sac, mean sac diameter 4.5 mm corresponding to approximately 5 weeks 0 days. No confirmatory embryo, heartbeat or yolk sac.  There are 3 small cystic foci also present within the uterine myometrium, largest of these measures 1 x 7 x 4 mm.  AMNIOTIC FLUID: Normal.  PLACENTA: Not yet formed. No evidence for sub-chorionic hemorrhage.    RIGHT OVARY: Normal. Corpus luteum present.  LEFT OVARY: Normal.      Impression    IMPRESSION:   1.  Small presumed gestational sac within fundal aspect of endometrium measuring approximately 5 weeks 0 days based on an sac diameter. No embryo, heartbeat or yolk sac identified.  2.  No worrisome adnexal lesions or free fluid.  3.  Suggest follow-up ultrasound in one week to help establish a more definitive EDC.  4.  Tiny cystic collections within the myometrium of doubtful significance.             Juan J Vinson MD  Hospitalist  [facility]

## 2020-09-26 NOTE — ANESTHESIA POSTPROCEDURE EVALUATION
Patient: Kiki Barker    Procedure(s):  DILATION AND CURETTAGE, UTERUS, USING SUCTION, WITH ULTRASOUND GUIDANCE    Diagnosis:Missed  [O02.1]  Diagnosis Additional Information: No value filed.    Anesthesia Type:  General    Note:  Anesthesia Post Evaluation    Patient location during evaluation: PACU  Patient participation: Able to fully participate in evaluation  Level of consciousness: awake and alert  Pain management: adequate  Airway patency: patent  Cardiovascular status: acceptable  Respiratory status: acceptable  Hydration status: acceptable  PONV: none     Anesthetic complications: None          Last vitals:  Vitals:    20 1630 20 1732 20 1745   BP: (!) 123/91 95/72 100/61   Pulse:  59 84   Resp: 14 14 15   Temp: 98.2  F (36.8  C) 98.2  F (36.8  C)    SpO2: 100% 100% 98%         Electronically Signed By: Christopher Watson MD  2020  5:50 PM

## 2020-09-26 NOTE — OR NURSING
Pt cardiac monitor read sinus patricia in PACU, Dr. Watson notified, EKG ordered and done.  Dr. Oscar read EKG, sinus patricia with sinus arrhythmia.  Report called to GERALDINE Luciano to transfer back to floor.

## 2020-09-26 NOTE — PLAN OF CARE
0045 Admitted through the ED. Denied pain. No fever. Up independently in the room. Voiding adequately.

## 2020-09-26 NOTE — ED PROVIDER NOTES
History     Chief Complaint:  Fever and Abnormal Labs      HPI   Kiki Barker is a 35 year old female who presents for the evaluation of fever and abnormal labs. She voices having nausea during her pregnancy however was without vomiting. She subsequently developed vague chills and body aches while feeling like she got run over prompting ED evaluation yesterday. She was incidentally noted to be febrile and underwent diagnostic evaluation to determine a source. This couldn't be determined and she was ultimately discharged home. Since that time she continued to feel fatigued,myalgias, persistent nausea,and now has upper abdominal pain. Pain is moderate,constant,no associated factors. Denies congestion, rhinorrhea, sore throat,coughing, wheezing, chest pain, SOB, vomiting, diarrhea, dark or bloody stools,urinary complaints.   LMP 8/21/2020    Allergies:  Neomycin      Medications:    Zyrtec    Past Medical History:    Asthma  Postpartum endometritis  Mitral regurgitation  SVT    Past Surgical History:    History reviewed. No pertinent surgical history.     Family History:    History reviewed. No pertinent family history.      Social History:  Smoking status: Never smoker  Alcohol use: Yes  Drug use: No  PCP: Olivia Lieberman   Marital Status:  Single [1]     Review of Systems   Constitutional: Positive for chills, fatigue and fever.   Respiratory: Negative for cough, chest tightness and shortness of breath.    Cardiovascular: Negative for chest pain.   Gastrointestinal: Positive for abdominal pain and nausea. Negative for diarrhea and vomiting.   Genitourinary: Positive for vaginal bleeding. Negative for dysuria, flank pain, frequency, hematuria and pelvic pain.   Musculoskeletal: Positive for myalgias.   All other systems reviewed and are negative.    Physical Exam     Patient Vitals for the past 24 hrs:   BP Temp Temp src Pulse Resp SpO2   09/25/20 2315 121/78 -- -- 81 -- 100 %   09/25/20 2300 119/82 -- -- 92  -- 100 %   09/25/20 2245 125/84 -- -- 96 -- 100 %   09/25/20 2230 124/88 -- -- -- -- --   09/25/20 2128 (!) 135/99 100  F (37.8  C) Oral 113 16 100 %      Physical Exam  Vitals signs and nursing note reviewed.   Constitutional:       General: She is not in acute distress.     Appearance: She is not diaphoretic.   HENT:      Head: Normocephalic and atraumatic.   Eyes:      General: No scleral icterus.  Cardiovascular:      Rate and Rhythm: Normal rate and regular rhythm.      Pulses: Normal pulses.      Heart sounds: Normal heart sounds.   Pulmonary:      Effort: Pulmonary effort is normal. No respiratory distress.      Breath sounds: Normal breath sounds.   Abdominal:      General: Bowel sounds are normal. There is no distension.      Palpations: Abdomen is soft.      Tenderness: There is no abdominal tenderness. There is no guarding or rebound.   Musculoskeletal:         General: No tenderness.   Skin:     General: Skin is warm.      Findings: No rash.   Neurological:      Mental Status: She is alert.       Emergency Department Course   Laboratory:  CBC: WNL (WBC 6.2, HGB 13.8, )  CMP: WNL (Creatinine 0.78)  Lipase:   Lactic acid whole blood (2245): WNL 0.9  Blood Culture x2: Pending  HCG quantitative pregnancy blood: >1,000 (H)    UA: Pending   Urine Culture: Pending    Symptomatic COVID-19 Virus (Coronavirus) by PCR Nasopharyngeal swab: Pending     Interventions:  2226, NS 1L IV Bolus  2227, Tylenol, 1,000 mg, PO  2227, NS 1L IV Bolus  2316, Rocephin, 2 g, IV    Emergency Department Course:  Past medical records, nursing notes, and vitals reviewed.  2158: I performed an exam of the patient and obtained history, as documented above.     IV inserted and blood drawn.     2345: I rechecked the patient. Explained findings to patient.     Findings and plan explained to the Patient who consents to admission. Discussed the patient with Dr. Vinson, who will admit the patient to a medical surgery bed for  further monitoring, evaluation, and treatment.        Impression & Plan    Covid-19  Kiki Barker was evaluated during a global COVID-19 pandemic, which necessitated consideration that the patient might be at risk for infection with the SARS-CoV-2 virus that causes COVID-19.   Applicable protocols for evaluation were followed during the patient's care. COVID-19 was considered as part of the patient's evaluation. The plan for testing is: a test was obtained during this visit.     Medical Decision Making:  She presents for evaluation of positive blood cultures and continued symptoms. She generally appears well,has reassuring vital signs,however is noted to demonstrate Gram negative rods in her blood cultures. She does mention upper abdominal pain but has a benign abdominal examination. She voices no specific symptoms to assist with determination of her infectious source. Yesterday's catheterized urine was unremarkable however a repeat today is to be obtained. Her diagnostic labs are reassuring and without SIRS criteria at this time. Rocephin initiated for her bacteremia. Plan for admission and continued care.    Diagnosis:    ICD-10-CM    1. Bacteremia  R78.81 CBC with platelets differential     Comprehensive metabolic panel     Lipase     Lactic acid whole blood     Blood culture     Blood culture     Symptomatic COVID-19 Virus (Coronavirus) by PCR     HCG quantitative pregnancy     HCG quantitative pregnancy     CANCELED: HCG QUANTitative pregnancy (blood)       Disposition:  Admitted to a medical surgery bed.     Scribe Disclosure:  IAshok, am serving as a scribe at 9:50 PM on 9/25/2020 to document services personally performed by Salvador Manjarrez MD based on my observations and the provider's statements to me.      Ashok Nj  9/25/2020   [facility] EMERGENCY DEPARTMENT       Salvador Manjarrez PA-C  09/25/20 0724

## 2020-09-26 NOTE — OP NOTE
Operative Report    Preoperative Diagnosis:  Spontaneous  with sepsis  Postoperative Diagnosis:  Same  Procedure:  Suction dilatation and curettage under ultrasound guidance  Surgeon: Gray Duran MD  Anesthesia:  General endotracheal anesthesia  Fluids:  300 cc LR, On Cetriaxone and Doxycycline IV prior to going to start of case  Estimated blood loss:  20 cc  Drains:  None    Indications for procedure:  Patient is a 35 year old year old , who has a spontaneous  diagnosed by abnormally rising HCGs combined with ultrasound showing an intrauterine sac with no fetal pole, cardiac activity, nor yolk sac.  She was admitted with sepsis/bacteremia for the last 2 days by fever on initial presentation with Gram negative rods on blood culture.  She has been ruled out for all other possible sites of infection, and the current working diagnosis is a septic .  She has been counseled regarding management with Suction dilatation and curettage under ultrasound guidance to definitively treat this, and she agrees.  She understands the indications, risks, benefits, and expectations of each option and has given informed consent to proceed with the above procedure.  Her blood type is A Positive.    Findings:  Pelvic examination under anesthesia- uterus 6 weeks size, anteverted, no adnexal masses.  Cervix - open to 5 mm; on dilatation and curettage there was a moderate amount of products of conception obtained.    Complications: None    Specimens:  Products of conception from the uterus sent for pathology and culture    Procedure in detail:  After proper patient identification and informed consent was obtained, the patient was taken to the operating room where adequate general endotracheal anesthesia was obtained.  The patient was placed in the dorsal lithotomy position with legs in Jan stirrups, and pelvic exam under anesthesia was performed with the above noted findings.  The patient was prepped and  draped in the usual sterile manner for this procedure.    The bladder was left un-drained to provide an acoustic window.  Real-time transabdominal ultrasound was performed by the technician to assist in ensuring that all products of conception were evacuated during the procedure.  A single-armed speculum was placed in the vagina to visualize the cervix.  The cervix was grasped with a single-toothed tenaculum for downward traction on the uterus.  The cervix was dilated with Hegar dilators large enough to insert a size 8 Curved suction cannula.  The cannula was inserted to the fundus, and suction curettage was performed using 60mmHg suction.  This was done for several passes until only blood was effluxing.  Sharp curettage was then performed with a medium curette until all quadrants were gritty in texture.  Two more passes with the suction cannula removed any residual blood and debris.  The tenaculum was removed and the cervix and uterus were noted to be hemostatic.  The speculum was removed.  Patient received Methergine 0.2 mg IM during the case.    The patient tolerated the procedure well.  All sponge and instrument counts were correct x 2.  The patient was taken to the recovery room in stable condition.    Patient will continue on Ceftriaxone IV and Doxycycline (change to po) once on the floor, and until confirmed afebrile x 24-48 hours postop.  Repeat CBC in AM.  When discharged plan for Doxycycline to continue to complete 14 days of antibiotics.    Gray Duran MD

## 2020-09-27 LAB
ERYTHROCYTE [DISTWIDTH] IN BLOOD BY AUTOMATED COUNT: 11.7 % (ref 10–15)
HCT VFR BLD AUTO: 36.8 % (ref 35–47)
HGB BLD-MCNC: 12.1 G/DL (ref 11.7–15.7)
MCH RBC QN AUTO: 31.6 PG (ref 26.5–33)
MCHC RBC AUTO-ENTMCNC: 32.9 G/DL (ref 31.5–36.5)
MCV RBC AUTO: 96 FL (ref 78–100)
PLATELET # BLD AUTO: 199 10E9/L (ref 150–450)
RBC # BLD AUTO: 3.83 10E12/L (ref 3.8–5.2)
WBC # BLD AUTO: 6.5 10E9/L (ref 4–11)

## 2020-09-27 PROCEDURE — 25000132 ZZH RX MED GY IP 250 OP 250 PS 637: Performed by: OBSTETRICS & GYNECOLOGY

## 2020-09-27 PROCEDURE — 85027 COMPLETE CBC AUTOMATED: CPT | Performed by: OBSTETRICS & GYNECOLOGY

## 2020-09-27 PROCEDURE — 25000128 H RX IP 250 OP 636: Performed by: OBSTETRICS & GYNECOLOGY

## 2020-09-27 PROCEDURE — 36415 COLL VENOUS BLD VENIPUNCTURE: CPT | Performed by: OBSTETRICS & GYNECOLOGY

## 2020-09-27 PROCEDURE — 99233 SBSQ HOSP IP/OBS HIGH 50: CPT | Performed by: HOSPITALIST

## 2020-09-27 PROCEDURE — 12000000 ZZH R&B MED SURG/OB

## 2020-09-27 RX ORDER — FAMOTIDINE 20 MG/1
20 TABLET, FILM COATED ORAL 2 TIMES DAILY
Status: DISCONTINUED | OUTPATIENT
Start: 2020-09-27 | End: 2020-09-28 | Stop reason: HOSPADM

## 2020-09-27 RX ORDER — CALCIUM CARBONATE 500 MG/1
500 TABLET, CHEWABLE ORAL 3 TIMES DAILY PRN
Status: DISCONTINUED | OUTPATIENT
Start: 2020-09-27 | End: 2020-09-28 | Stop reason: HOSPADM

## 2020-09-27 RX ADMIN — METHYLERGONOVINE MALEATE 200 MCG: 0.2 TABLET ORAL at 14:04

## 2020-09-27 RX ADMIN — PRENATAL VITAMINS-IRON FUMARATE 27 MG IRON-FOLIC ACID 0.8 MG TABLET 1 TABLET: at 08:23

## 2020-09-27 RX ADMIN — IBUPROFEN 600 MG: 600 TABLET ORAL at 19:57

## 2020-09-27 RX ADMIN — FAMOTIDINE 20 MG: 20 TABLET, FILM COATED ORAL at 19:57

## 2020-09-27 RX ADMIN — ACETAMINOPHEN 1000 MG: 500 TABLET, FILM COATED ORAL at 08:23

## 2020-09-27 RX ADMIN — DOXYCYCLINE HYCLATE 100 MG: 100 CAPSULE ORAL at 10:27

## 2020-09-27 RX ADMIN — IBUPROFEN 600 MG: 600 TABLET ORAL at 14:07

## 2020-09-27 RX ADMIN — IBUPROFEN 600 MG: 600 TABLET ORAL at 06:42

## 2020-09-27 RX ADMIN — METHYLERGONOVINE MALEATE 200 MCG: 0.2 TABLET ORAL at 19:57

## 2020-09-27 RX ADMIN — DOXYCYCLINE HYCLATE 100 MG: 100 CAPSULE ORAL at 21:58

## 2020-09-27 RX ADMIN — METHYLERGONOVINE MALEATE 200 MCG: 0.2 TABLET ORAL at 08:25

## 2020-09-27 RX ADMIN — CEFTRIAXONE 2 G: 2 INJECTION, POWDER, FOR SOLUTION INTRAMUSCULAR; INTRAVENOUS at 22:35

## 2020-09-27 ASSESSMENT — ACTIVITIES OF DAILY LIVING (ADL)
ADLS_ACUITY_SCORE: 10

## 2020-09-27 NOTE — PLAN OF CARE
Alert and oriented. Afebrile. Tolerating regular diet. Up independently in room. Endorses very scant spotting. Voiding in adequate amounts. Pain managed with prn Tylenol and Ibuprofen. On IV Rocephin and PO Doxycycline. Plans d/c to home tomorrow.

## 2020-09-27 NOTE — PROGRESS NOTES
[facility]    Hospitalist Progress Note    Date of Service (when I saw the patient): 2020  Provider:  Sriram Rice MD   Text Page  7am - 6PM       Assessment & Plan   Kiki Barker is a 35 year old female who was admitted on 2020. She presents with three days of fevers up to 101.5  F at home with intermittent shaking chills, myalgias, nausea, mild epigastric discomfort described as burning, and decreased p.o. intake. Positive pregnancy test with vaginal spotting.    Principal Problem:  1. Spontaneous  with septicemia s/p D&C pod1    Assessment: non viable pregnancy per OB Gyn following HCG curve, recent uterine bleeding, fever chills etc  Taken to OR today for curettage and aspiration yesterday, no complication afebrile..        2.  Haemophilus influenzae bacteremia.  Responding to Rocephin, and doxycycline added by OB/GYN       3. Covid 19 negative pcr test.       4. History SVT, PFO: History of PFO.  Also, history of SVT and was seen by EP, but did not need ablation.  Brother has WPW.        5. H/O Asthma:  has albuterol inhaler as needed at baseline.  No sign of acute exacerbation    DVT Prophylaxis: Pneumatic Compression Devices  Code Status: Full Code    Disposition: Expected discharge in24 if not complications.    Interval History   No fever, no bleeding, no pain.       -Data reviewed today: I reviewed all new labs and imaging results over the last 24 hours.     Physical Exam   Temp: 98.1  F (36.7  C) Temp src: Temporal BP: 120/82 Pulse: 69   Resp: 16 SpO2: 100 % O2 Device: None (Room air) Oxygen Delivery: 5 LPM  Vitals:    20 0045   Weight: 73.5 kg (162 lb 1.6 oz)     Vital Signs with Ranges  Temp:  [97.3  F (36.3  C)-98.2  F (36.8  C)] 98.1  F (36.7  C)  Pulse:  [50-90] 69  Resp:  [14-21] 16  BP: ()/(61-91) 120/82  SpO2:  [97 %-100 %] 100 %  I/O last 3 completed shifts:  In: .33 [P.O.:540; I.V.:1498.33]  Out: 1350 [Urine:1350]    GEN:  Alert,  oriented x 3, appears comfortable, NAD.  HEENT:  Normocephalic/atraumatic, no scleral icterus, no nasal discharge, mouth moist.  CV:  Regular rate and rhythm, no murmur or JVD.  S1 + S2 noted, no S3 or S4.  LUNGS:  Clear to auscultation bilaterally without rales/rhonchi/wheezing/retractions.  Symmetric chest rise on inhalation noted.  ABD:  Active bowel sounds, soft, non-tender/non-distended.  No rebound/guarding/rigidity.  EXT:  No edema or cyanosis.  No joint synovitis noted.  SKIN:  Dry to touch, no exanthems noted in the visualized areas.       Medications     NO Rho (D) immune globulin (RhoGam) needed - mother Rh POSITIVE         cefTRIAXone  2 g Intravenous Q24H     doxycycline hyclate  100 mg Oral Q12H HOMERO     famotidine  20 mg Oral BID     influenza quadrivalent (PF) vacc  0.5 mL Intramuscular Prior to discharge     methylergonovine  200 mcg Oral TID     prenatal multivitamin w/iron  1 tablet Oral Daily     sodium chloride (PF)  3 mL Intracatheter Q8H       Data   Recent Labs   Lab 09/27/20  0652 09/26/20  0734 09/25/20  2229 09/24/20  2126   WBC 6.5 4.5 6.2 15.1*   HGB 12.1 11.4* 13.8 13.5   MCV 96 97 95 96    178 218 243   NA  --  138 137 135   POTASSIUM  --  3.6 3.5 3.4   CHLORIDE  --  112* 108 105   CO2  --  22 24 23   BUN  --  9 12 16   CR  --  0.76 0.78 0.81   ANIONGAP  --  4 5 7   PAMELA  --  7.5* 8.8 9.3   GLC  --  89 90 108*   ALBUMIN  --   --  3.9 4.2   PROTTOTAL  --   --  7.9 7.7   BILITOTAL  --   --  1.2 1.9*   ALKPHOS  --   --  60 61   ALT  --   --  17 14   AST  --   --  17 17   LIPASE  --   --  167  --        Recent Results (from the past 24 hour(s))   US Intraoperative    Narrative    This exam was marked as non-reportable because it will not be read by a   radiologist or a [place] non-radiologist provider.           Disclaimer: This note consists of symbols derived from keyboarding, dictation and/or voice recognition software. As a result, there may be errors in the script that have gone  undetected. Please consider this when interpreting information found in this chart.

## 2020-09-27 NOTE — PROVIDER NOTIFICATION
2016 paged hospitalist: May we have orders to saline lock in between IV antibiotics. Patient is eating and drinking fluids well. Thank you    5127 alert and oriented. Afebrile. Pain managed with Tylenol. On Rocephin and Doxycycline. Up independently. Voiding adequately.

## 2020-09-27 NOTE — PROGRESS NOTES
[facility] GYN Progress Note    Kiki Barker MRN# [id number]   Age: 35 year old YOB: 1985     Date of Admission:  2020           Subjective:   Patient reports that she is overall feeling better. She has noted significant GERD symtpoms with PO doxycycline. Pain is well controlled with POs. Reports scant vaginal spotting. She is tolerating PO intake without nausea or vomiting.         Objective:     Patient Vitals for the past 12 hrs:   BP Temp Temp src Pulse Resp SpO2   20 0821 -- -- -- -- 16 --   20 0724 120/82 98.1  F (36.7  C) Temporal 69 16 100 %   20 0413 110/78 97.7  F (36.5  C) Temporal 90 16 97 %       Intake/Output Summary (Last 24 hours) at 2020 1331  Last data filed at 2020 0800  Gross per 24 hour   Intake 2038.33 ml   Output 1350 ml   Net 688.33 ml       Exam:  GEN - sitting comfortably in bed, in NAD  CV - RRR, no murmurs appreciated  PULM - CTAB, good air movement throughout, no wheezing  ABD - soft, non-distended, non-tender  Ext - no edema, non-tender    Labs:  BMP  Recent Labs   Lab 20  0734 20    137 135   POTASSIUM 3.6 3.5 3.4   CHLORIDE 112* 108 105   PAMELA 7.5* 8.8 9.3   CO2 22 24 23   BUN 9 12 16   CR 0.76 0.78 0.81   GLC 89 90 108*     CBC  Recent Labs   Lab 20  0652 20  0734 20   WBC 6.5 4.5 6.2 15.1*   RBC 3.83 3.62* 4.32 4.21   HGB 12.1 11.4* 13.8 13.5   HCT 36.8 35.0 41.2 40.2   MCV 96 97 95 96   MCH 31.6 31.5 31.9 32.1   MCHC 32.9 32.6 33.5 33.6   RDW 11.7 11.9 11.9 11.9    178 218 243            Assessment and Plan:   Assessment/Plan: Kiki Barker is a 35 year old admitted for management of septic  with bacteremia. Now POD#1 s/p suction D&C. Feeling much better at this time. Afebrile.     1. FEN: advance as tolerated  2. Pain: tylenol and ibuprofen PRN  3. Heme: Pre-op Hgb 11.4 > EBL 20 mL>12.1. Blood type A+, Rhogam not indicated  4. GI:  adding pepcid and tums for GERD  5. : voiding spontaneously. Scant vaginal spotting, appropriate.  6. ID: on PO doxycycline and IV rocephin. Plan 1 further dose of rocephin in the morning prior to discharge, will need to complete a full 14 days of twice a day doxycycline.   7. Dispo: anticipate discharge in the morning pending any worsening of clinical presentation. Recommended home urine pregnancy test in 3-4 weeks and follow up in clinic with primary OB in 2-4w to discuss contraception.     Yuko Fishman MD  OB/GYN

## 2020-09-28 VITALS
HEIGHT: 69 IN | OXYGEN SATURATION: 99 % | SYSTOLIC BLOOD PRESSURE: 124 MMHG | HEART RATE: 75 BPM | WEIGHT: 162.1 LBS | BODY MASS INDEX: 24.01 KG/M2 | RESPIRATION RATE: 16 BRPM | TEMPERATURE: 98.2 F | DIASTOLIC BLOOD PRESSURE: 67 MMHG

## 2020-09-28 PROCEDURE — 25000128 H RX IP 250 OP 636: Performed by: OBSTETRICS & GYNECOLOGY

## 2020-09-28 PROCEDURE — 25000132 ZZH RX MED GY IP 250 OP 250 PS 637: Performed by: OBSTETRICS & GYNECOLOGY

## 2020-09-28 PROCEDURE — 99239 HOSP IP/OBS DSCHRG MGMT >30: CPT | Performed by: HOSPITALIST

## 2020-09-28 RX ORDER — DOXYCYCLINE HYCLATE 100 MG
100 TABLET ORAL 2 TIMES DAILY
Qty: 28 TABLET | Refills: 0 | Status: SHIPPED | OUTPATIENT
Start: 2020-09-28 | End: 2020-10-12

## 2020-09-28 RX ADMIN — CEFTRIAXONE 2 G: 2 INJECTION, POWDER, FOR SOLUTION INTRAMUSCULAR; INTRAVENOUS at 10:24

## 2020-09-28 RX ADMIN — FAMOTIDINE 20 MG: 20 TABLET, FILM COATED ORAL at 07:46

## 2020-09-28 RX ADMIN — DOXYCYCLINE HYCLATE 100 MG: 100 CAPSULE ORAL at 10:24

## 2020-09-28 RX ADMIN — METHYLERGONOVINE MALEATE 200 MCG: 0.2 TABLET ORAL at 07:46

## 2020-09-28 RX ADMIN — PRENATAL VITAMINS-IRON FUMARATE 27 MG IRON-FOLIC ACID 0.8 MG TABLET 1 TABLET: at 07:49

## 2020-09-28 ASSESSMENT — ACTIVITIES OF DAILY LIVING (ADL)
ADLS_ACUITY_SCORE: 10

## 2020-09-28 NOTE — DISCHARGE SUMMARY
Subjective:     Patient ID: Tacey Patient is a 41 y o  male  [facility] Progress Notes      Specialized Services Documentation  Therapist must complete separate progress note for each specific clinical activity in which the individual participated during the day  Group Psychotherapy   [phone]) Murray Muniz participated in psychotherapy group focused on acknowledging progress made in recovery  Murray Muniz identified his agoraphobia holding him back as a stressor today  He actively engaged in group discussion, talking about how he is very frustrated at not being able to do the things he used to do, like go to the movies or [employer] like his peers talked about in morning assessment  Murray Muniz tearfully expressed his frustration, noting that he knows he is doing all the right things and is impatient to feel better and get his life back  Peers pointed out that Murray Muniz is already making progress, such as going to [employer], and spending a few minutes at a park over the weekend  They also provided him with ideas for ways to push himself in small increments so he continues to make progress without setting himself too far back  Peers encouraged each other to recognize the steps they have made, no matter how small, in order to increase motivation and patience with work toward recovery  Moderate progress toward goals today  Continue psychotherapy group to encourage Murray Muniz to explore stressors and coping      Tx Plan Objective: 1 1, 1 2, Therapist:  Baljeet FOURNI[facility]    Discharge Summary  Hospitalist    Date of Admission:  2020  Date of Discharge:  2020  Provider:  Sriram Rice MD  Date of Service (when I last saw the patient): 20    Discharge Diagnoses     1. Spontaneous  with septicemia s/p D&C pod #2    2.  Haemophilus influenzae bacteremia.        3. Covid 19 negative pcr test.     4. History SVT, PFO.    5. H/O Asthma     Other medical issues:  Past Medical History:   Diagnosis Date     Asthma      PFO (patent foramen ovale)      SVT (supraventricular tachycardia) (H)     seen by EP, no ablation needed       History of Present Illness   Kiki Barker is an 35 year old female who presented with fever and vaginal bleding.  Please see the admission history and physical for full details.    Hospital Course   Kiki Barker is a 35 year old female who was admitted on 2020. She presents with three days of fevers up to 101.5  F at home with intermittent shaking chills, myalgias, nausea, mild epigastric discomfort described as burning, and decreased p.o. intake. Positive pregnancy test with vaginal spotting.  She has been afebrile after admission and underwent dilation and curettage done by OB gyn. No complications after procedure. Outpatient antibiotic treatment deferred to Gyn.       Principal Problem:  1. Spontaneous  with septicemia s/p D&C pod #2    Assessment: non viable pregnancy per OB Gyn following HCG curve, recent uterine bleeding, fever chills etc  Taken to OR  for dilation & curettage, no complications post procedure, afebrile.        2.  Haemophilus influenzae bacteremia.  Responding to Rocephin, and doxycycline added by OB/GYN       3. Covid 19 negative pcr test.        4. History SVT, PFO: History of PFO.  Also, history of SVT and was seen by EP, but did not need ablation.  Brother has WPW.        5. H/O Asthma:  has albuterol inhaler as needed at baseline.  No sign of acute exacerbation.     #  Discharge Pain Plan:    - Patient currently has NO PAIN and is not being prescribed pain medications on discharge.      Significant Results and Procedures   D&C, see below     Pending Results     Unresulted Labs Ordered in the Past 30 Days of this Admission     Date and Time Order Name Status Description    9/26/2020 1723 Miscellaneous Culture Aerobic Bacterial Preliminary     9/26/2020 1723 Anaerobic bacterial culture Preliminary     9/26/2020 1723 Surgical pathology exam In process     9/25/2020 2151 Blood culture Preliminary     9/25/2020 2151 Blood culture Preliminary     9/24/2020 2143 Blood culture Preliminary     9/24/2020 2143 Blood culture Preliminary           Code Status   Full Code       Primary Care Physician   Olivia Lieberman    GEN:  Alert, oriented x 3, appears comfortable, NAD.  HEENT:  Normocephalic/atraumatic, no scleral icterus, no nasal discharge, mouth moist.  CV:  Regular rate and rhythm, no murmur or JVD.  S1 + S2 noted, no S3 or S4.  LUNGS:  Clear to auscultation bilaterally without rales/rhonchi/wheezing/retractions.  Symmetric chest rise on inhalation noted.  ABD:  Active bowel sounds, soft, non-tender/non-distended.  No rebound/guarding/rigidity.  EXT:  No edema or cyanosis.  No joint synovitis noted.  SKIN:  Dry to touch, no exanthems noted in the visualized areas.     Discharge Disposition   Discharged to home    Consultations This Hospital Stay   OB GYN IP CONSULT    Time Spent on this Encounter   I, Sriram Rice MD, personally saw the patient today and spent greater than 30 minutes discharging this patient.     Discharge Orders   No discharge procedures on file.  Discharge Medications   Current Discharge Medication List      START taking these medications    Details   doxycycline hyclate (VIBRA-TABS) 100 MG tablet Take 1 tablet (100 mg) by mouth 2 times daily for 14 days  Qty: 28 tablet, Refills: 0    Comments: Future refills by PCP Dr. Olivia Lieberman with phone number  [phone].  Associated Diagnoses: Spontaneous  with septicemia         CONTINUE these medications which have NOT CHANGED    Details   albuterol (PROAIR HFA/PROVENTIL HFA/VENTOLIN HFA) 108 (90 Base) MCG/ACT inhaler Inhale 2 puffs into the lungs every 6 hours as needed for shortness of breath / dyspnea or wheezing    Comments: Pharmacy may dispense brand covered by insurance (Proair, or proventil or ventolin or generic albuterol inhaler)      fexofenadine (ALLEGRA) 180 MG tablet Take 180 mg by mouth daily      Prenatal Vit-Fe Fumarate-FA (PRENATAL MULTIVITAMIN W/IRON) 27-0.8 MG tablet Take 1 tablet by mouth daily           Allergies   Allergies   Allergen Reactions     Neomycin      Data   Most Recent 3 CBC's:  Recent Labs   Lab Test 20  0652 20   WBC 6.5 4.5 6.2   HGB 12.1 11.4* 13.8   MCV 96 97 95    178 218      Most Recent 3 BMP's:  Recent Labs   Lab Test 20  0734 20  2126    137 135   POTASSIUM 3.6 3.5 3.4   CHLORIDE 112* 108 105   CO2 22 24 23   BUN 9 12 16   CR 0.76 0.78 0.81   ANIONGAP 4 5 7   PAMELA 7.5* 8.8 9.3   GLC 89 90 108*     Most Recent 2 LFT's:  Recent Labs   Lab Test 20  2126   AST 17 17   ALT 17 14   ALKPHOS 60 61   BILITOTAL 1.2 1.9*     Most Recent INR's and Anticoagulation Dosing History:  Anticoagulation Dose History     There is no flowsheet data to display.        Most Recent 3 Troponin's:  Recent Labs   Lab Test 19  1515   TROPI <0.015     Most Recent Cholesterol Panel:No lab results found.  Most Recent 6 Bacteria Isolates From Any Culture (See EPIC Reports for Culture Details):  Recent Labs   Lab Test 20  1721 20  22320  22320  22120  2200   CULT Culture negative monitoring continues  Culture negative monitoring continues No growth after 2 days No growth after 2 days 10,000 to 50,000 colonies/mL  mixed urogenital  delores  Susceptibility testing not routinely done   Cultured on the 1st day of incubation:  Haemophilus influenzae  Non typeable by standard serotyping. Sent to [facility] for further testing.  *  Critical Value/Significant Value, preliminary result only, called to and read back by  Nathaniel Bartholomew RN 2019 9/25/20 AM    (Note)  NEGATIVE for the following organisms and resistance markers:  Acinetobacter sp., Citrobacter sp., Enterobacter sp., Proteus sp., E.  coli, K. pneumoniae/oxytoca, P. aeruginosa, CTX-M, KPC, NDM, VIM, IMP  and OXA by [employer] multiplex nucleic acid test. Final identification  and antimicrobial susceptibility testing will be verified by standard  methods.    Critical Value/Significant Value called to and read back by  So Ware RN 2231 9/25/20 AM     No growth after 3 days     Most Recent TSH, T4 and A1c Labs:  Recent Labs   Lab Test 09/28/19  1515   TSH 1.77     Results for orders placed or performed during the hospital encounter of 09/25/20   US Abdomen Limited    Narrative    EXAM: US ABDOMEN LIMITED  LOCATION: [facility]  DATE/TIME: 9/26/2020 1:22 AM    INDICATION: Upper abdominal pain and fever  COMPARISON: None.  TECHNIQUE: Limited abdominal ultrasound.    FINDINGS:    GALLBLADDER: No visible cholelithiasis or wall thickening. Small nonmobile echogenic foci presumably polyps measuring up to 4 mm. Sonographic Wells's sign negative.    BILE DUCTS: No biliary dilatation. The common duct measures 6 mm.    LIVER: Grossly within normal limits where seen.    RIGHT KIDNEY: No hydronephrosis.    PANCREAS: Partial observation by bowel gas.    No visible ascites.      Impression    IMPRESSION:  1.  No visible cholelithiasis or biliary dilatation. Sonographic Wells's sign negative.  2.  Small polyps. 1 year follow-up recommended.   US Intraoperative    Narrative    This exam was marked as non-reportable because it will not be read by a   radiologist or a [facility] non-radiologist  provider.                 Disclaimer: This note consists of symbols derived from keyboarding, dictation and/or voice recognition software. As a result, there may be errors in the script that have gone undetected. Please consider this when interpreting information found in this chart.

## 2020-09-28 NOTE — PLAN OF CARE
Pt being discharged to home today explained DC paperwork including s/s to monitor for, diet, activity, s/s to monitor for.  New medications were discussed and general questions answered.  Pt will follow up with MD as directed. No issues at time of discharge.

## 2020-09-28 NOTE — PROGRESS NOTES
[facility] Gynecology Progress Note    POD#2/HD#3  No acute events overnight.   Doing well. Pain well controlled on oral pain medication. Tolerating regular diet. Voiding well. Ambulating without difficulty. Scant vaginal spotting.      Vitals:    20 0821 20 1407 20 1557 20 2319   BP:   123/70 106/76   BP Location:   Right arm Right arm   Pulse:   56 67   Resp: 16 16 15 16   Temp:   98  F (36.7  C) 98.3  F (36.8  C)   TempSrc:   Temporal Temporal   SpO2:   97% 96%   Weight:       Height:         General Appearance: NAD  Abdomen: Soft, NT, ND.  Extremities: NT, trace edema    Hemoglobin   Date Value Ref Range Status   2020 12.1 11.7 - 15.7 g/dL Final   2020 11.4 (L) 11.7 - 15.7 g/dL Final   ]  Assessment/Plan: Kiki Barker is a 35 year old admitted for management of septic  with bacteremia. Now POD#2 s/p suction D&C. Afebrile and hemodynamically stable.     1. FEN: advance as tolerated  2. Pain: tylenol and ibuprofen PRN  3. Heme: Pre-op Hgb 11.4 > EBL 20 mL>12.1. Blood type A+, Rhogam not indicated  4. GI: GERD controlled on pepcid and tums   5. : voiding spontaneously. Scant vaginal spotting, appropriate.  6. ID: on PO doxycycline and IV rocephin. Plan 1 further dose of rocephin this AM prior to discharge; plan is for outpatient completion of doxycycline PO BID x 14 days  7. Dispo: stable for discharge home today from Gyn point of view.  Recommended home urine pregnancy test in 3-4 weeks and follow up in clinic with primary OB in 2-4w to discuss contraception. -    Kaveh Cameron MD  [facility]

## 2020-09-28 NOTE — PLAN OF CARE
Alert and oriented. Afebrile. LS clear. Pain managed with Ibuprofen. On Rocephin and doxycycline . Up independently. Tolerating regular diet. Reports only scant vaginal spotting. Voiding adequately. Plans to discharge to home today.

## 2020-09-29 LAB — COPATH REPORT: NORMAL

## 2020-10-01 LAB
BACTERIA SPEC CULT: NO GROWTH
BACTERIA SPEC CULT: NO GROWTH
SPECIMEN SOURCE: NORMAL
SPECIMEN SOURCE: NORMAL

## 2020-10-03 LAB
BACTERIA SPEC CULT: NORMAL
Lab: NORMAL
SPECIMEN SOURCE: NORMAL

## 2020-10-06 ENCOUNTER — TELEPHONE (OUTPATIENT)
Dept: OBGYN | Facility: CLINIC | Age: 35
End: 2020-10-06

## 2020-10-06 NOTE — TELEPHONE ENCOUNTER
Patient requesting letter.  Her current OB office states she needs an office from you.  Out of work 9/28-10/2.  See ER note.  Letter written and saved.  Patient will access via my chart.  To call back if any questions.  Natty Bagley RN

## 2020-10-06 NOTE — LETTER
[address] [address]  [phone]      October 6, 2020    Kiki CATIA Barker                                                                                                                                                       [id number] [address]          To Whom it May Concern.      Kiki had been under my care for a medical/surgical concern.    Please excuse her from work for 9/28/20 through 10/2/20.    Please feel free to contact us for any questions.    Thank you.            Sincerely,        Gray Duran MD

## 2020-10-08 LAB — INTERPRETATION ECG - MUSE: NORMAL

## 2020-10-22 LAB
BACTERIA SPEC CULT: ABNORMAL
SPECIMEN SOURCE: ABNORMAL

## 2020-12-13 ENCOUNTER — HEALTH MAINTENANCE LETTER (OUTPATIENT)
Age: 35
End: 2020-12-13

## 2021-09-26 ENCOUNTER — HEALTH MAINTENANCE LETTER (OUTPATIENT)
Age: 36
End: 2021-09-26

## 2022-01-16 ENCOUNTER — HEALTH MAINTENANCE LETTER (OUTPATIENT)
Age: 37
End: 2022-01-16

## 2022-09-21 ENCOUNTER — LAB REQUISITION (OUTPATIENT)
Dept: LAB | Facility: CLINIC | Age: 37
End: 2022-09-21

## 2022-09-21 DIAGNOSIS — Z01.419 ENCOUNTER FOR GYNECOLOGICAL EXAMINATION (GENERAL) (ROUTINE) WITHOUT ABNORMAL FINDINGS: ICD-10-CM

## 2022-09-21 PROCEDURE — G0145 SCR C/V CYTO,THINLAYER,RESCR: HCPCS | Performed by: OBSTETRICS & GYNECOLOGY

## 2022-09-21 PROCEDURE — 87624 HPV HI-RISK TYP POOLED RSLT: CPT | Performed by: OBSTETRICS & GYNECOLOGY

## 2022-09-26 LAB
BKR LAB AP GYN ADEQUACY: NORMAL
BKR LAB AP GYN INTERPRETATION: NORMAL
BKR LAB AP HPV REFLEX: NORMAL
BKR LAB AP LMP: NORMAL
BKR LAB AP PREVIOUS ABNL DX: NORMAL
BKR LAB AP PREVIOUS ABNORMAL: NORMAL
PATH REPORT.COMMENTS IMP SPEC: NORMAL
PATH REPORT.COMMENTS IMP SPEC: NORMAL
PATH REPORT.RELEVANT HX SPEC: NORMAL

## 2022-09-27 LAB
HUMAN PAPILLOMA VIRUS 16 DNA: NEGATIVE
HUMAN PAPILLOMA VIRUS 18 DNA: NEGATIVE
HUMAN PAPILLOMA VIRUS FINAL DIAGNOSIS: NORMAL
HUMAN PAPILLOMA VIRUS OTHER HR: NEGATIVE

## 2023-04-23 ENCOUNTER — HEALTH MAINTENANCE LETTER (OUTPATIENT)
Age: 38
End: 2023-04-23

## 2025-02-27 ENCOUNTER — LAB REQUISITION (OUTPATIENT)
Dept: LAB | Facility: CLINIC | Age: 40
End: 2025-02-27
Payer: COMMERCIAL

## 2025-02-27 DIAGNOSIS — Z12.4 ENCOUNTER FOR SCREENING FOR MALIGNANT NEOPLASM OF CERVIX: ICD-10-CM

## 2025-02-27 PROCEDURE — 87624 HPV HI-RISK TYP POOLED RSLT: CPT | Mod: ORL | Performed by: OBSTETRICS & GYNECOLOGY

## 2025-02-27 PROCEDURE — 88141 CYTOPATH C/V INTERPRET: CPT | Performed by: PATHOLOGY

## 2025-02-27 PROCEDURE — G0145 SCR C/V CYTO,THINLAYER,RESCR: HCPCS | Mod: ORL | Performed by: OBSTETRICS & GYNECOLOGY

## 2025-03-06 LAB
BKR AP ASSOCIATED HPV REPORT: ABNORMAL
BKR LAB AP GYN ADEQUACY: ABNORMAL
BKR LAB AP GYN INTERPRETATION: ABNORMAL
BKR LAB AP LMP: ABNORMAL
BKR LAB AP PREVIOUS ABNL DX: ABNORMAL
BKR LAB AP PREVIOUS ABNORMAL: ABNORMAL
PATH REPORT.COMMENTS IMP SPEC: ABNORMAL
PATH REPORT.COMMENTS IMP SPEC: ABNORMAL
PATH REPORT.RELEVANT HX SPEC: ABNORMAL

## 2025-03-11 LAB
HPV HR 12 DNA CVX QL NAA+PROBE: POSITIVE
HPV16 DNA CVX QL NAA+PROBE: NEGATIVE
HPV18 DNA CVX QL NAA+PROBE: POSITIVE
HUMAN PAPILLOMA VIRUS FINAL DIAGNOSIS: ABNORMAL

## 2025-04-03 ENCOUNTER — HOSPITAL ENCOUNTER (EMERGENCY)
Facility: CLINIC | Age: 40
End: 2025-04-03
Payer: COMMERCIAL

## 2025-04-25 ENCOUNTER — LAB REQUISITION (OUTPATIENT)
Dept: LAB | Facility: CLINIC | Age: 40
End: 2025-04-25
Payer: COMMERCIAL

## 2025-04-25 DIAGNOSIS — Z87.42 PERSONAL HISTORY OF OTHER DISEASES OF THE FEMALE GENITAL TRACT: ICD-10-CM

## 2025-04-25 PROCEDURE — 88305 TISSUE EXAM BY PATHOLOGIST: CPT | Mod: TC,ORL | Performed by: OBSTETRICS & GYNECOLOGY

## 2025-04-25 PROCEDURE — 88305 TISSUE EXAM BY PATHOLOGIST: CPT | Mod: 26 | Performed by: STUDENT IN AN ORGANIZED HEALTH CARE EDUCATION/TRAINING PROGRAM

## 2025-04-29 LAB
PATH REPORT.COMMENTS IMP SPEC: NORMAL
PATH REPORT.COMMENTS IMP SPEC: NORMAL
PATH REPORT.FINAL DX SPEC: NORMAL
PATH REPORT.GROSS SPEC: NORMAL
PATH REPORT.MICROSCOPIC SPEC OTHER STN: NORMAL
PATH REPORT.RELEVANT HX SPEC: NORMAL
PHOTO IMAGE: NORMAL

## (undated) DEVICE — LINEN TOWEL PACK X5 5464

## (undated) DEVICE — FILTER BERKLEY SAFETOUCH

## (undated) DEVICE — TUBING SUCTION VACUUM COLLECTION 6FT 610

## (undated) DEVICE — SPECIMEN TRAP VACUUM SUCTION 003984-901

## (undated) DEVICE — SUCTION CANNULA UTERINE 08MM CVD  20317

## (undated) DEVICE — GLOVE PROTEXIS POWDER FREE 7.0 ORTHOPEDIC 2D73ET70

## (undated) DEVICE — LINEN HALF SHEET 5512

## (undated) DEVICE — TUBE CULTURE ANAEROBIC PORT-A-CUL 11ML

## (undated) DEVICE — SOL NACL 0.9% IRRIG 1000ML BOTTLE 2F7124

## (undated) DEVICE — PACK MINOR LITHOTOMY RIDGES

## (undated) DEVICE — PAD CHUX UNDERPAD 30X36" P3036C

## (undated) DEVICE — GLOVE PROTEXIS BLUE W/NEU-THERA 6.5  2D73EB65

## (undated) DEVICE — GLOVE PROTEXIS BLUE W/NEU-THERA 7.0  2D73EB70

## (undated) DEVICE — BAG CLEAR TRASH 1.3M 39X33" P4040C

## (undated) DEVICE — LINEN FULL SHEET 5511

## (undated) RX ORDER — DEXAMETHASONE SODIUM PHOSPHATE 4 MG/ML
INJECTION, SOLUTION INTRA-ARTICULAR; INTRALESIONAL; INTRAMUSCULAR; INTRAVENOUS; SOFT TISSUE
Status: DISPENSED
Start: 2020-09-26

## (undated) RX ORDER — LIDOCAINE HYDROCHLORIDE 10 MG/ML
INJECTION, SOLUTION EPIDURAL; INFILTRATION; INTRACAUDAL; PERINEURAL
Status: DISPENSED
Start: 2020-09-26

## (undated) RX ORDER — ONDANSETRON 2 MG/ML
INJECTION INTRAMUSCULAR; INTRAVENOUS
Status: DISPENSED
Start: 2020-09-26

## (undated) RX ORDER — GLYCOPYRROLATE 0.2 MG/ML
INJECTION INTRAMUSCULAR; INTRAVENOUS
Status: DISPENSED
Start: 2020-09-26

## (undated) RX ORDER — PROPOFOL 10 MG/ML
INJECTION, EMULSION INTRAVENOUS
Status: DISPENSED
Start: 2020-09-26

## (undated) RX ORDER — ACETAMINOPHEN 325 MG/1
TABLET ORAL
Status: DISPENSED
Start: 2020-09-26

## (undated) RX ORDER — FENTANYL CITRATE 50 UG/ML
INJECTION, SOLUTION INTRAMUSCULAR; INTRAVENOUS
Status: DISPENSED
Start: 2020-09-26

## (undated) RX ORDER — KETOROLAC TROMETHAMINE 30 MG/ML
INJECTION, SOLUTION INTRAMUSCULAR; INTRAVENOUS
Status: DISPENSED
Start: 2020-09-26